# Patient Record
Sex: FEMALE | Race: WHITE
[De-identification: names, ages, dates, MRNs, and addresses within clinical notes are randomized per-mention and may not be internally consistent; named-entity substitution may affect disease eponyms.]

---

## 2017-08-16 ENCOUNTER — HOSPITAL ENCOUNTER (EMERGENCY)
Dept: HOSPITAL 83 - ED | Age: 22
Discharge: HOME | End: 2017-08-16
Payer: COMMERCIAL

## 2017-08-16 VITALS — WEIGHT: 140 LBS | HEIGHT: 62.99 IN | BODY MASS INDEX: 24.8 KG/M2

## 2017-08-16 DIAGNOSIS — M62.830: Primary | ICD-10-CM

## 2017-08-16 DIAGNOSIS — Z91.030: ICD-10-CM

## 2017-08-16 DIAGNOSIS — M54.6: ICD-10-CM

## 2017-08-16 DIAGNOSIS — K21.9: ICD-10-CM

## 2017-08-16 DIAGNOSIS — F17.200: ICD-10-CM

## 2017-08-16 DIAGNOSIS — G43.909: ICD-10-CM

## 2017-08-16 DIAGNOSIS — Z79.899: ICD-10-CM

## 2017-10-07 ENCOUNTER — HOSPITAL ENCOUNTER (EMERGENCY)
Dept: HOSPITAL 83 - ED | Age: 22
Discharge: HOME | End: 2017-10-07
Payer: COMMERCIAL

## 2017-10-07 VITALS — HEIGHT: 55 IN

## 2017-10-07 DIAGNOSIS — Z91.018: ICD-10-CM

## 2017-10-07 DIAGNOSIS — G43.909: ICD-10-CM

## 2017-10-07 DIAGNOSIS — K02.9: Primary | ICD-10-CM

## 2017-10-07 DIAGNOSIS — R03.0: ICD-10-CM

## 2017-10-07 DIAGNOSIS — Z91.030: ICD-10-CM

## 2017-10-07 DIAGNOSIS — F17.200: ICD-10-CM

## 2017-10-07 DIAGNOSIS — K21.9: ICD-10-CM

## 2018-02-02 ENCOUNTER — HOSPITAL ENCOUNTER (EMERGENCY)
Dept: HOSPITAL 83 - ED | Age: 23
Discharge: HOME | End: 2018-02-02
Payer: COMMERCIAL

## 2018-02-02 VITALS — HEIGHT: 62.99 IN | WEIGHT: 160 LBS | BODY MASS INDEX: 28.35 KG/M2

## 2018-02-02 DIAGNOSIS — F17.200: ICD-10-CM

## 2018-02-02 DIAGNOSIS — G43.909: ICD-10-CM

## 2018-02-02 DIAGNOSIS — Z91.030: ICD-10-CM

## 2018-02-02 DIAGNOSIS — K02.9: Primary | ICD-10-CM

## 2018-02-02 DIAGNOSIS — K21.9: ICD-10-CM

## 2018-02-02 DIAGNOSIS — Z91.018: ICD-10-CM

## 2018-02-02 DIAGNOSIS — R03.0: ICD-10-CM

## 2018-02-06 ENCOUNTER — HOSPITAL ENCOUNTER (EMERGENCY)
Dept: HOSPITAL 83 - ED | Age: 23
Discharge: HOME | End: 2018-02-06
Payer: COMMERCIAL

## 2018-02-06 VITALS — BODY MASS INDEX: 28.35 KG/M2 | HEIGHT: 62.99 IN | WEIGHT: 160 LBS

## 2018-02-06 DIAGNOSIS — Z91.018: ICD-10-CM

## 2018-02-06 DIAGNOSIS — F17.200: ICD-10-CM

## 2018-02-06 DIAGNOSIS — Z91.030: ICD-10-CM

## 2018-02-06 DIAGNOSIS — K02.9: Primary | ICD-10-CM

## 2018-02-06 DIAGNOSIS — Z79.899: ICD-10-CM

## 2018-06-20 ENCOUNTER — HOSPITAL ENCOUNTER (EMERGENCY)
Dept: HOSPITAL 83 - ED | Age: 23
Discharge: HOME | End: 2018-06-20
Payer: COMMERCIAL

## 2018-06-20 VITALS — BODY MASS INDEX: 28.35 KG/M2 | HEIGHT: 62.99 IN | WEIGHT: 160 LBS

## 2018-06-20 DIAGNOSIS — R30.0: Primary | ICD-10-CM

## 2018-06-20 DIAGNOSIS — R35.0: ICD-10-CM

## 2018-06-20 DIAGNOSIS — R39.15: ICD-10-CM

## 2018-06-20 DIAGNOSIS — Z91.030: ICD-10-CM

## 2018-06-20 LAB
APPEARANCE UR: (no result)
BACTERIA #/AREA URNS HPF: (no result) /[HPF]
BILIRUB UR QL STRIP: NEGATIVE
COLOR UR: YELLOW
EPI CELLS #/AREA URNS HPF: (no result) /[HPF]
GLUCOSE UR QL: NEGATIVE
HGB UR QL STRIP: (no result)
KETONES UR QL STRIP: NEGATIVE
LEUKOCYTE ESTERASE UR QL STRIP: (no result)
NITRITE UR QL STRIP: NEGATIVE
PH UR STRIP: 6.5 [PH] (ref 5–9)
RBC #/AREA URNS HPF: (no result) RBC/HPF (ref 0–2)
SP GR UR: 1.01 (ref 1–1.03)
UROBILINOGEN UR STRIP-MCNC: 0.2 E.U./DL (ref 0.2–1)
WBC #/AREA URNS HPF: (no result) WBC/HPF (ref 0–5)

## 2019-01-14 ENCOUNTER — HOSPITAL ENCOUNTER (EMERGENCY)
Dept: HOSPITAL 83 - ED | Age: 24
Discharge: HOME | End: 2019-01-14
Payer: COMMERCIAL

## 2019-01-14 VITALS — BODY MASS INDEX: 27.31 KG/M2 | HEIGHT: 63.98 IN | WEIGHT: 160 LBS

## 2019-01-14 DIAGNOSIS — Z32.01: Primary | ICD-10-CM

## 2019-01-14 DIAGNOSIS — G43.909: ICD-10-CM

## 2019-01-14 DIAGNOSIS — Z91.018: ICD-10-CM

## 2019-01-14 DIAGNOSIS — R03.0: ICD-10-CM

## 2019-01-14 DIAGNOSIS — Z91.030: ICD-10-CM

## 2019-01-14 DIAGNOSIS — K21.9: ICD-10-CM

## 2019-01-14 LAB
APPEARANCE UR: CLEAR
BACTERIA #/AREA URNS HPF: (no result) /[HPF]
BILIRUB UR QL STRIP: NEGATIVE
COLOR UR: YELLOW
EPI CELLS #/AREA URNS HPF: (no result) /[HPF]
GLUCOSE UR QL: NEGATIVE
HGB UR QL STRIP: (no result)
KETONES UR QL STRIP: NEGATIVE
LEUKOCYTE ESTERASE UR QL STRIP: NEGATIVE
NITRITE UR QL STRIP: NEGATIVE
PH UR STRIP: 6.5 [PH] (ref 5–9)
RBC #/AREA URNS HPF: (no result) RBC/HPF (ref 0–2)
SP GR UR: 1.01 (ref 1–1.03)
UROBILINOGEN UR STRIP-MCNC: 0.2 E.U./DL (ref 0.2–1)
WBC #/AREA URNS HPF: (no result) WBC/HPF (ref 0–5)

## 2019-01-30 ENCOUNTER — HOSPITAL ENCOUNTER (OUTPATIENT)
Dept: HOSPITAL 83 - US | Age: 24
Discharge: HOME | End: 2019-01-30
Attending: OBSTETRICS & GYNECOLOGY
Payer: COMMERCIAL

## 2019-01-30 DIAGNOSIS — Z3A.01: ICD-10-CM

## 2019-01-30 DIAGNOSIS — Z34.01: Primary | ICD-10-CM

## 2019-04-03 ENCOUNTER — HOSPITAL ENCOUNTER (EMERGENCY)
Dept: HOSPITAL 83 - ED | Age: 24
Discharge: HOME | End: 2019-04-03
Payer: COMMERCIAL

## 2019-04-03 VITALS — WEIGHT: 136 LBS | BODY MASS INDEX: 24.1 KG/M2 | HEIGHT: 62.99 IN

## 2019-04-03 DIAGNOSIS — O99.332: ICD-10-CM

## 2019-04-03 DIAGNOSIS — F17.200: ICD-10-CM

## 2019-04-03 DIAGNOSIS — Z91.030: ICD-10-CM

## 2019-04-03 DIAGNOSIS — Z91.018: ICD-10-CM

## 2019-04-03 DIAGNOSIS — O26.892: Primary | ICD-10-CM

## 2019-04-03 DIAGNOSIS — E86.0: ICD-10-CM

## 2019-04-03 DIAGNOSIS — Z79.899: ICD-10-CM

## 2019-04-03 DIAGNOSIS — Z3A.16: ICD-10-CM

## 2019-04-03 DIAGNOSIS — Z91.040: ICD-10-CM

## 2019-04-03 LAB
APPEARANCE UR: (no result)
BACTERIA #/AREA URNS HPF: (no result) /[HPF]
BASOPHILS # BLD AUTO: 0.1 10*3/UL (ref 0–0.1)
BASOPHILS NFR BLD AUTO: 0.4 % (ref 0–1)
BILIRUB UR QL STRIP: NEGATIVE
BUN SERPL-MCNC: 4 MG/DL (ref 7–24)
CHLORIDE SERPL-SCNC: 105 MMOL/L (ref 98–107)
COLOR UR: YELLOW
CREAT SERPL-MCNC: 0.51 MG/DL (ref 0.55–1.02)
EOSINOPHIL # BLD AUTO: 0.1 10*3/UL (ref 0–0.4)
EOSINOPHIL # BLD AUTO: 0.8 % (ref 1–4)
ERYTHROCYTE [DISTWIDTH] IN BLOOD BY AUTOMATED COUNT: 12.8 % (ref 0–14.5)
GLUCOSE UR QL: NEGATIVE
HCT VFR BLD AUTO: 38.5 % (ref 37–47)
HGB BLD-MCNC: 12.9 G/DL (ref 12–16)
HGB UR QL STRIP: NEGATIVE
KETONES UR QL STRIP: (no result)
LEUKOCYTE ESTERASE UR QL STRIP: NEGATIVE
LYMPHOCYTES # BLD AUTO: 2.2 10*3/UL (ref 1.3–4.4)
LYMPHOCYTES NFR BLD AUTO: 16.3 % (ref 27–41)
MCH RBC QN AUTO: 29.1 PG (ref 27–31)
MCHC RBC AUTO-ENTMCNC: 33.5 G/DL (ref 33–37)
MCV RBC AUTO: 86.7 FL (ref 81–99)
MONOCYTES # BLD AUTO: 0.9 10*3/UL (ref 0.1–1)
MONOCYTES NFR BLD MANUAL: 6.4 % (ref 3–9)
MUCOUS THREADS URNS QL MICRO: (no result)
NEUT #: 10.3 10*3/UL (ref 2.3–7.9)
NEUT %: 75.5 % (ref 47–73)
NITRITE UR QL STRIP: NEGATIVE
NRBC BLD QL AUTO: 0 % (ref 0–0)
PH UR STRIP: 6.5 [PH] (ref 5–9)
PLATELET # BLD AUTO: 194 10*3/UL (ref 130–400)
PMV BLD AUTO: 12.3 FL (ref 9.6–12.3)
POTASSIUM SERPL-SCNC: 3.3 MMOL/L (ref 3.5–5.1)
RBC # BLD AUTO: 4.44 10*6/UL (ref 4.1–5.1)
SODIUM SERPL-SCNC: 139 MMOL/L (ref 136–145)
SP GR UR: 1.01 (ref 1–1.03)
UROBILINOGEN UR STRIP-MCNC: 0.2 E.U./DL (ref 0.2–1)
WBC #/AREA URNS HPF: (no result) WBC/HPF (ref 0–5)
WBC NRBC COR # BLD AUTO: 13.7 10*3/UL (ref 4.8–10.8)

## 2019-05-06 ENCOUNTER — ROUTINE PRENATAL (OUTPATIENT)
Dept: OBGYN CLINIC | Age: 24
End: 2019-05-06
Payer: COMMERCIAL

## 2019-05-06 VITALS
HEIGHT: 63 IN | DIASTOLIC BLOOD PRESSURE: 66 MMHG | WEIGHT: 139 LBS | BODY MASS INDEX: 24.63 KG/M2 | HEART RATE: 65 BPM | SYSTOLIC BLOOD PRESSURE: 115 MMHG

## 2019-05-06 DIAGNOSIS — Z3A.20 20 WEEKS GESTATION OF PREGNANCY: ICD-10-CM

## 2019-05-06 DIAGNOSIS — O99.332 TOBACCO SMOKING AFFECTING PREGNANCY IN SECOND TRIMESTER: ICD-10-CM

## 2019-05-06 DIAGNOSIS — Z03.75 SUSPECTED SHORTENING OF CERVIX NOT FOUND: ICD-10-CM

## 2019-05-06 DIAGNOSIS — Z36.89 ENCOUNTER FOR FETAL ANATOMIC SURVEY: ICD-10-CM

## 2019-05-06 DIAGNOSIS — O09.212 PREVIOUS PRETERM DELIVERY IN SECOND TRIMESTER, ANTEPARTUM: ICD-10-CM

## 2019-05-06 DIAGNOSIS — F12.20 CANNABIS DEPENDENCE (HCC): ICD-10-CM

## 2019-05-06 DIAGNOSIS — O99.322 DRUG DEPENDENCE AFFECTING PREGNANCY IN SECOND TRIMESTER: Primary | ICD-10-CM

## 2019-05-06 PROCEDURE — G8427 DOCREV CUR MEDS BY ELIG CLIN: HCPCS | Performed by: OBSTETRICS & GYNECOLOGY

## 2019-05-06 PROCEDURE — 76811 OB US DETAILED SNGL FETUS: CPT | Performed by: OBSTETRICS & GYNECOLOGY

## 2019-05-06 PROCEDURE — 99201 HC NEW PT, E/M LEVEL 1: CPT | Performed by: OBSTETRICS & GYNECOLOGY

## 2019-05-06 PROCEDURE — 76817 TRANSVAGINAL US OBSTETRIC: CPT | Performed by: OBSTETRICS & GYNECOLOGY

## 2019-05-06 PROCEDURE — G8420 CALC BMI NORM PARAMETERS: HCPCS | Performed by: OBSTETRICS & GYNECOLOGY

## 2019-05-06 PROCEDURE — 99242 OFF/OP CONSLTJ NEW/EST SF 20: CPT | Performed by: OBSTETRICS & GYNECOLOGY

## 2019-05-06 NOTE — LETTER
19    RE:  Jessica Coelho   : 1995   AGE: 25 y.o. REFERRING PHYSICIAN:     Coretta Rodriguez MD    Dear Dr. Almaz aggarwal for referring Jessica Coelho a 25 y.o.  [id number] who is seen today in our office. REASON FOR CONSULTATION:  · Consultation and comanagement of a pregnant patient with history of substance abuse, cigarette smoking, and the previous  delivery. Mrs Jessica Coelho gave the following history when I saw her today:    OB History    Para Term  AB Living   3 1 0 1 1 1   SAB TAB Ectopic Molar Multiple Live Births   1 0 0 0 0 1      # Outcome Date GA Lbr Delvin/2nd Weight Sex Delivery Anes PTL Lv   3 Current            2 2014           1  12 36w4d  5 lb 6 oz (2.438 kg) F Vag-Spont  N BRENDON      Birth Comments: PTL     PAST GYNECOLOGICAL  HISTORY:  Negative for abnormal pap smears requiring surgical treatment. Negative for sexually transmitted diseases. PAST MEDICAL HISTORY:   Negative for Hypertension, Diabetes, Thyroid disease , Asthma or Heart disease. PAST SURGICAL HISTORY:  Negative for Appendectomy, Cholecystectomy or surgery on the cervix such as LEEP, [facility] or Cryotherapy. ALLERGIES:    No Known Allergies    MEDICATIONS:    Prenatal Vitamins once per day   17p 250 mg IM once per week. SOCIAL  HISTORY:  She smokes one pack of cigarettes per day. Last used marijuana was three weeks ago. She denied alcohol and other illicit drug abuse      REVIEW OF SYSTEMS:    CONSTITUTIONAL : No fever, no chills   HEENT :  No headache, no visual changes, no rhinorrhea, no sore throat   CARDIOVASCULAR :  No pain, no palpitations, no edema   RESPIRATORY :  No pain, no shortness of breath   GASTROINTESTINAL : No N/V, no D/C, no abdominal pain   GENITOURINARY :  No dysuria, hematuria and no incontinence   MUSCULOSKELETAL:  No myalgia, No back pain  NEUROLOGICAL :  No numbness, no tingling, no tremors.  No history of seizures I reviewed with the patient the result of the:  · Quad screen test collected  On 2019 that showed low probability of having baby with open spina bifida and trisomy 24 or 25. · Positive drug screen for marijuana on 2019    IMPRESSION:  1. A 20w4d intrauterine pregnancy. 2. Previous  delivery. 3. Marijuana abuse complicating pregnancy  4. Cigarette smoking during pregnancy. RECOMMENDATIONS/PLAN:  I discussed with the patient the following points:    1. The benefits and limitations of ultrasound in prenatal diagnosis. Some defects might not always be seen by ultrasound. Estimated incidence of these defects in the general population is 2- 4%. 2. No structural  anomalies are noted. Only genetic amniocentesis can rule out fetal chromosome anomalies. Normal ultrasound does not. 3. The size of her baby is appropriate for gestational age. 4. Based on her age and the absence of chromosomal markers by ultrasound today, the risk of chromosomal anomalies is small and does not indicate a need for an amniocentesis, a procedure that might cause pregnancy loss. ( the risk of loss is quoted to be between 1:200 to 1:500). An amniocentesis is indicated if fetal structural defects are seen or if the first Trimester,  second trimester hormonal screening test (quad screen) , or if the cell free DNA test NIPT: non-invasive prenatal test) showed an increase risk for Chromosomal anomalies. 5. Her second Trimester Hormonal Screening test showed a small risk that does not justify a genetic amniocentesis. 6. The ill effects of cigarette smoking and substance abuse during pregnancy and its association with an increased risk of intrauterine growth restriction, placental abruption, and pregnancy loss.  In addition to the increased risk of  morbidity and mortality there is an increased risk of sudden infant death syndrome in the households where people smoke. I recommend that she stops smoking, and abstains from illicit drug use. 7. She should continue the treatment with weekly IM injections of 17 AlphahydroxyProgesterone Caproate (17p) 250 mg, to decrease the likelihood of another  delivery. 8. The cervical length correlates with the risk of  delivery. The cervical length today is reassuring. No funneling or shortening were noted. 9. She is to continue to follow with you in your office for ongoing obstetric care. 10. I recommend a follow up ultrasound evaluation in 8 weeks, to check on cervical length and on fetal well being anatomy and growth. Thank you again, doctor, for allowing us to be of service to your patient. If I can be of further assistance, please do not hesitate to call. Sincerely,        Jacky Welsh M.D., [address] [facility] [address]    Current encounter billing:  IA OFFICE CONSULTATION NEW/ESTAB PATIENT 30 MIN [21346]  US OB Detail Fetal Anatomy Single or 1st [[id number] Custom]  US OB Transvaginal [id number] Custom]    **This report has been created using voice recognition software.  It may contain minor errors     which are inherent in voice recognition technology**

## 2019-05-06 NOTE — PATIENT INSTRUCTIONS
Call your primary obstetrician with bleeding, leaking of fluid, abdominal tenderness, headache, blurry vision, epigastric pain and increased urinary frequency. Any questions contact Kristine Montejo at [phone]. if you are sick, not feeling well or have an infectious process going on please reschedule your appointment by calling [phone]. Also if any family members are not feeling well, please do not bring them to your appointment. We appreciate your cooperation. We are doing this in order to protect our pregnant mothers+ their babies. Patient Education        Weeks 18 to 22 of Your Pregnancy: Care Instructions  Your Care Instructions    Your baby is continuing to develop quickly. At this stage, babies can now suck their thumbs,  firmly with their hands, and open and close their eyelids. Sometime between 18 and 22 weeks, you will start to feel your baby move. At first, these small fetal movements feel like fluttering or \"butterflies. \" Some women say that they feel like gas bubbles. As the baby grows, these movements will become stronger. You may also notice that your baby kicks and hiccups. During this time, you may find that your nausea and fatigue are gone. Overall, you may feel better and have more energy than you did in your first trimester. But you may also have new discomforts now, such as sleep problems or leg cramps. This care sheet can help you ease these discomforts. Follow-up care is a key part of your treatment and safety. Be sure to make and go to all appointments, and call your doctor if you are having problems. It's also a good idea to know your test results and keep a list of the medicines you take. How can you care for yourself at home? Ease sleep problems  · Avoid caffeine in drinks or chocolate late in the day. · Get some exercise every day. · Take a warm shower or bath before bed. · Have a light snack or glass of milk at bedtime.   · Do relaxation exercises in bed to calm your mind and body.  · Support your legs and back with extra pillows. Try a pillow between your legs if you sleep on your side. · Do not use sleeping pills or alcohol. They could harm your baby. Ease leg cramps  · Do not massage your calf during the cramp. · Sit on a firm bed or chair. Straighten your leg, and bend your foot (flex your ankle) slowly upward, toward your knee. Bend your toes up and down. · Stand on a cool, flat surface. Stretch your toes upward, and take small steps walking on your heels. · Use a heating pad or hot water bottle to help with muscle ache. Prevent leg cramps  · Be sure to get enough calcium. If you are worried that you are not getting enough, talk to your doctor. · Exercise every day, and stretch your legs before bed. · Take a warm bath before bed, and try leg warmers at night. Where can you learn more? Go to https://SalesPortal.ZeePearl. org and sign in to your ThinAir Wireless account. Enter Y235 in the Mission Research box to learn more about \"Weeks 18 to 22 of Your Pregnancy: Care Instructions. \"     If you do not have an account, please click on the \"Sign Up Now\" link. Current as of: September 5, 2018  Content Version: 11.9  © 1004-4722 Widemile, Incorporated. Care instructions adapted under license by [facility] ([facility]). If you have questions about a medical condition or this instruction, always ask your healthcare professional. [place] [address] any warranty or liability for your use of this information. Patient Education        Learning About When to Call Your Doctor During Pregnancy (After 20 Weeks)  Your Care Instructions  It's common to have concerns about what might be a problem during pregnancy. Although most pregnant women don't have any serious problems, it's important to know when to call your doctor if you have certain symptoms or signs of labor. These are general suggestions.  Your doctor may give you some more information about when to call.  When to call your doctor (after 20 weeks)  Call 911 anytime you think you may need emergency care. For example, call if:  · You have severe vaginal bleeding. · You have sudden, severe pain in your belly. · You passed out (lost consciousness). · You have a seizure. · You see or feel the umbilical cord. · You think you are about to deliver your baby and can't make it safely to the hospital.  Call your doctor now or seek immediate medical care if:  · You have vaginal bleeding. · You have belly pain. · You have a fever. · You have symptoms of preeclampsia, such as:  ? Sudden swelling of your face, hands, or feet. ? New vision problems (such as dimness or blurring). ? A severe headache. · You have a sudden release of fluid from your vagina. (You think your water broke.)  · You think that you may be in labor. This means that you've had at least 4 contractions within 20 minutes or at least 8 contractions in an hour. · You notice that your baby has stopped moving or is moving much less than normal.  · You have symptoms of a urinary tract infection. These may include:  ? Pain or burning when you urinate. ? A frequent need to urinate without being able to pass much urine. ? Pain in the flank, which is just below the rib cage and above the waist on either side of the back. ? Blood in your urine. Watch closely for changes in your health, and be sure to contact your doctor if:  · You have vaginal discharge that smells bad. · You have skin changes, such as:  ? A rash. ? Itching. ? Yellow color to your skin. · You have other concerns about your pregnancy. If you have labor signs at 37 weeks or more  If you have signs of labor at 37 weeks or more, your doctor may tell you to call when your labor becomes more active. Symptoms of active labor include:  · Contractions that are regular. · Contractions that are less than 5 minutes apart. · Contractions that are hard to talk through.   Follow-up care is a key part of your treatment and safety. Be sure to make and go to all appointments, and call your doctor if you are having problems. It's also a good idea to know your test results and keep a list of the medicines you take. Where can you learn more? Go to https://chpenancyeweb.Fortressware. org and sign in to your Ziparihart account. Enter  in the Sanovi Technologies box to learn more about \"Learning About When to Call Your Doctor During Pregnancy (After 20 Weeks). \"     If you do not have an account, please click on the \"Sign Up Now\" link. Current as of: September 5, 2018  Content Version: 11.9  © 0995-7032 LUX Assure, Incorporated. Care instructions adapted under license by [facility] ([facility]). If you have questions about a medical condition or this instruction, always ask your healthcare professional. [address] any warranty or liability for your use of this information.

## 2019-05-06 NOTE — PROGRESS NOTES
19    RE:  Alvin Woo   : 1995   AGE: 25 y.o. REFERRING PHYSICIAN:     Prosper Van MD    Dear Dr. Katy Lanes you for referring Alvin Woo a 25 y.o.  [id number] who is seen today in our office. REASON FOR CONSULTATION:  · Consultation and comanagement of a pregnant patient with history of substance abuse, cigarette smoking, and the previous  delivery. Mrs Alvin Woo gave the following history when I saw her today:    OB History    Para Term  AB Living   3 1 0 1 1 1   SAB TAB Ectopic Molar Multiple Live Births   1 0 0 0 0 1      # Outcome Date GA Lbr Delvin/2nd Weight Sex Delivery Anes PTL Lv   3 Current            2 2014           1  12 36w4d  5 lb 6 oz (2.438 kg) F Vag-Spont  N BRENDON      Birth Comments: PTL     PAST GYNECOLOGICAL  HISTORY:  Negative for abnormal pap smears requiring surgical treatment. Negative for sexually transmitted diseases. PAST MEDICAL HISTORY:   Negative for Hypertension, Diabetes, Thyroid disease , Asthma or Heart disease. PAST SURGICAL HISTORY:  Negative for Appendectomy, Cholecystectomy or surgery on the cervix such as LEEP, [facility] or Cryotherapy. ALLERGIES:    No Known Allergies    MEDICATIONS:    Prenatal Vitamins once per day   17p 250 mg IM once per week. SOCIAL  HISTORY:  She smokes one pack of cigarettes per day. Last used marijuana was three weeks ago. She denied alcohol and other illicit drug abuse      REVIEW OF SYSTEMS:    CONSTITUTIONAL : No fever, no chills   HEENT :  No headache, no visual changes, no rhinorrhea, no sore throat   CARDIOVASCULAR :  No pain, no palpitations, no edema   RESPIRATORY :  No pain, no shortness of breath   GASTROINTESTINAL : No N/V, no D/C, no abdominal pain   GENITOURINARY :  No dysuria, hematuria and no incontinence   MUSCULOSKELETAL:  No myalgia, No back pain  NEUROLOGICAL :  No numbness, no tingling, no tremors.  No history of seizures    FAMILY MEDICAL of the:  · Quad screen test collected  On 2019 that showed low probability of having baby with open spina bifida and trisomy 24 or 18. · Positive drug screen for marijuana on 2019    IMPRESSION:  1. A 20w4d intrauterine pregnancy. 2. Previous  delivery. 3. Marijuana abuse complicating pregnancy  4. Cigarette smoking during pregnancy. RECOMMENDATIONS/PLAN:  I discussed with the patient the following points:    1. The benefits and limitations of ultrasound in prenatal diagnosis. Some defects might not always be seen by ultrasound. Estimated incidence of these defects in the general population is 2- 4%. 2. No structural  anomalies are noted. Only genetic amniocentesis can rule out fetal chromosome anomalies. Normal ultrasound does not. 3. The size of her baby is appropriate for gestational age. 4. Based on her age and the absence of chromosomal markers by ultrasound today, the risk of chromosomal anomalies is small and does not indicate a need for an amniocentesis, a procedure that might cause pregnancy loss. ( the risk of loss is quoted to be between 1:200 to 1:500). An amniocentesis is indicated if fetal structural defects are seen or if the first Trimester,  second trimester hormonal screening test (quad screen) , or if the cell free DNA test NIPT: non-invasive prenatal test) showed an increase risk for Chromosomal anomalies. 5. Her second Trimester Hormonal Screening test showed a small risk that does not justify a genetic amniocentesis. 6. The ill effects of cigarette smoking and substance abuse during pregnancy and its association with an increased risk of intrauterine growth restriction, placental abruption, and pregnancy loss. In addition to the increased risk of  morbidity and mortality there is an increased risk of sudden infant death syndrome in the households where people smoke. I recommend that she stops smoking, and abstains from illicit drug use.   7. She should continue the treatment with weekly IM injections of 17 AlphahydroxyProgesterone Caproate (17p) 250 mg, to decrease the likelihood of another  delivery. 8. The cervical length correlates with the risk of  delivery. The cervical length today is reassuring. No funneling or shortening were noted. 9. She is to continue to follow with you in your office for ongoing obstetric care. 10. I recommend a follow up ultrasound evaluation in 8 weeks, to check on cervical length and on fetal well being anatomy and growth. Thank you again, doctor, for allowing us to be of service to your patient. If I can be of further assistance, please do not hesitate to call. Sincerely,        Zhang Johns M.D., [address] [facility]    Current encounter billing:  IN OFFICE CONSULTATION NEW/ESTAB PATIENT 30 MIN [[id number]]  US OB Detail Fetal Anatomy Single or 1st [[id number] Custom]  US OB Transvaginal [id number] Custom]    **This report has been created using voice recognition software.  It may contain minor errors     which are inherent in voice recognition technology**

## 2019-07-01 ENCOUNTER — ROUTINE PRENATAL (OUTPATIENT)
Dept: OBGYN CLINIC | Age: 24
End: 2019-07-01
Payer: COMMERCIAL

## 2019-07-01 VITALS
WEIGHT: 150 LBS | HEIGHT: 63 IN | BODY MASS INDEX: 26.58 KG/M2 | HEART RATE: 81 BPM | SYSTOLIC BLOOD PRESSURE: 120 MMHG | DIASTOLIC BLOOD PRESSURE: 74 MMHG

## 2019-07-01 DIAGNOSIS — O99.322 DRUG DEPENDENCE AFFECTING PREGNANCY IN SECOND TRIMESTER: ICD-10-CM

## 2019-07-01 DIAGNOSIS — Z03.75 SUSPECTED SHORTENING OF CERVIX NOT FOUND: ICD-10-CM

## 2019-07-01 DIAGNOSIS — Z3A.28 28 WEEKS GESTATION OF PREGNANCY: ICD-10-CM

## 2019-07-01 DIAGNOSIS — O99.333 TOBACCO SMOKING COMPLICATING PREGNANCY, THIRD TRIMESTER: ICD-10-CM

## 2019-07-01 DIAGNOSIS — O09.213 PREVIOUS PRETERM DELIVERY IN THIRD TRIMESTER, ANTEPARTUM: Primary | ICD-10-CM

## 2019-07-01 DIAGNOSIS — F12.20 CANNABIS DEPENDENCE (HCC): ICD-10-CM

## 2019-07-01 DIAGNOSIS — Z36.89 ENCOUNTER FOR FETAL ANATOMIC SURVEY: ICD-10-CM

## 2019-07-01 PROCEDURE — 76817 TRANSVAGINAL US OBSTETRIC: CPT | Performed by: OBSTETRICS & GYNECOLOGY

## 2019-07-01 PROCEDURE — G8419 CALC BMI OUT NRM PARAM NOF/U: HCPCS | Performed by: OBSTETRICS & GYNECOLOGY

## 2019-07-01 PROCEDURE — 76805 OB US >/= 14 WKS SNGL FETUS: CPT | Performed by: OBSTETRICS & GYNECOLOGY

## 2019-07-01 PROCEDURE — 99213 OFFICE O/P EST LOW 20 MIN: CPT | Performed by: OBSTETRICS & GYNECOLOGY

## 2019-07-01 PROCEDURE — G8427 DOCREV CUR MEDS BY ELIG CLIN: HCPCS | Performed by: OBSTETRICS & GYNECOLOGY

## 2019-07-01 PROCEDURE — 4004F PT TOBACCO SCREEN RCVD TLK: CPT | Performed by: OBSTETRICS & GYNECOLOGY

## 2019-07-01 PROCEDURE — 99211 OFF/OP EST MAY X REQ PHY/QHP: CPT | Performed by: OBSTETRICS & GYNECOLOGY

## 2019-07-01 PROCEDURE — 76819 FETAL BIOPHYS PROFIL W/O NST: CPT | Performed by: OBSTETRICS & GYNECOLOGY

## 2019-07-01 NOTE — PATIENT INSTRUCTIONS
below it. Move your fingers down toward your belly button to find the top of your uterus. Check to see if it is tight. ? Contractions can be weak or strong. Record your contractions for an hour. Time a contraction from the start of one contraction to the start of the next one.  ? Single or several strong contractions without a pattern are called Hopewell-Haile contractions. They are practice contractions but not the start of labor. They often stop if you change what you are doing. ? Call your doctor if you have regular contractions. Where can you learn more? Go to https://[email or web address] org and sign in to your Accuris Networks account. Enter K604 in the Starline box to learn more about \"Weeks 26 to 30 of Your Pregnancy: Care Instructions. \"     If you do not have an account, please click on the \"Sign Up Now\" link. Current as of: September 5, 2018  Content Version: 12.0  © 7977-3012 [employer]. Care instructions adapted under license by [facility] ([facility]). If you have questions about a medical condition or this instruction, always ask your healthcare professional. [place] [address] any warranty or liability for your use of this information. Patient Education        Learning About When to Call Your Doctor During Pregnancy (After 20 Weeks)  Your Care Instructions  It's common to have concerns about what might be a problem during pregnancy. Although most pregnant women don't have any serious problems, it's important to know when to call your doctor if you have certain symptoms or signs of labor. These are general suggestions. Your doctor may give you some more information about when to call. When to call your doctor (after 20 weeks)  Call 911 anytime you think you may need emergency care. For example, call if:  · You have severe vaginal bleeding. · You have sudden, severe pain in your belly. · You passed out (lost consciousness). · You have a seizure.   · You

## 2019-07-30 ENCOUNTER — ROUTINE PRENATAL (OUTPATIENT)
Dept: OBGYN CLINIC | Age: 24
End: 2019-07-30
Payer: COMMERCIAL

## 2019-07-30 VITALS
WEIGHT: 155 LBS | BODY MASS INDEX: 27.46 KG/M2 | SYSTOLIC BLOOD PRESSURE: 118 MMHG | HEART RATE: 96 BPM | DIASTOLIC BLOOD PRESSURE: 68 MMHG

## 2019-07-30 DIAGNOSIS — O99.323 DRUG DEPENDENCE AFFECTING PREGNANCY IN THIRD TRIMESTER: ICD-10-CM

## 2019-07-30 DIAGNOSIS — Z3A.32 32 WEEKS GESTATION OF PREGNANCY: ICD-10-CM

## 2019-07-30 DIAGNOSIS — O99.333 TOBACCO SMOKING COMPLICATING PREGNANCY, THIRD TRIMESTER: ICD-10-CM

## 2019-07-30 DIAGNOSIS — F12.20 CANNABIS DEPENDENCE (HCC): ICD-10-CM

## 2019-07-30 DIAGNOSIS — Z03.75 SUSPECTED SHORTENING OF CERVIX NOT FOUND: ICD-10-CM

## 2019-07-30 DIAGNOSIS — O09.213 PREVIOUS PRETERM DELIVERY IN THIRD TRIMESTER, ANTEPARTUM: Primary | ICD-10-CM

## 2019-07-30 LAB
GLUCOSE URINE, POC: NORMAL
PROTEIN UA: POSITIVE

## 2019-07-30 PROCEDURE — 4004F PT TOBACCO SCREEN RCVD TLK: CPT | Performed by: OBSTETRICS & GYNECOLOGY

## 2019-07-30 PROCEDURE — 99211 OFF/OP EST MAY X REQ PHY/QHP: CPT | Performed by: OBSTETRICS & GYNECOLOGY

## 2019-07-30 PROCEDURE — G8427 DOCREV CUR MEDS BY ELIG CLIN: HCPCS | Performed by: OBSTETRICS & GYNECOLOGY

## 2019-07-30 PROCEDURE — 81002 URINALYSIS NONAUTO W/O SCOPE: CPT | Performed by: OBSTETRICS & GYNECOLOGY

## 2019-07-30 PROCEDURE — 76816 OB US FOLLOW-UP PER FETUS: CPT | Performed by: OBSTETRICS & GYNECOLOGY

## 2019-07-30 PROCEDURE — 76819 FETAL BIOPHYS PROFIL W/O NST: CPT | Performed by: OBSTETRICS & GYNECOLOGY

## 2019-07-30 PROCEDURE — 99213 OFFICE O/P EST LOW 20 MIN: CPT | Performed by: OBSTETRICS & GYNECOLOGY

## 2019-07-30 PROCEDURE — 76817 TRANSVAGINAL US OBSTETRIC: CPT | Performed by: OBSTETRICS & GYNECOLOGY

## 2019-07-30 PROCEDURE — G8419 CALC BMI OUT NRM PARAM NOF/U: HCPCS | Performed by: OBSTETRICS & GYNECOLOGY

## 2019-07-30 RX ORDER — AMOXICILLIN 500 MG/1
500 CAPSULE ORAL 4 TIMES DAILY
COMMUNITY
End: 2019-08-20

## 2019-07-30 NOTE — PATIENT INSTRUCTIONS
test positive, you will get antibiotics during labor. These will keep your baby from getting the bacteria. You may want to talk with your doctor about banking your baby's umbilical cord blood. This is the blood left in the cord after birth. If you want to save this blood, you must arrange it ahead of time. You can't decide at the last minute. If you haven't already had the Tdap shot during this pregnancy, talk to your doctor about getting it. It will help protect your  against pertussis infection. Follow-up care is a key part of your treatment and safety. Be sure to make and go to all appointments, and call your doctor if you are having problems. It's also a good idea to know your test results and keep a list of the medicines you take. How can you care for yourself at home? Ease hemorrhoids  · Get more liquids, fruits, vegetables, and fiber in your diet. This will help keep your stools soft. · Avoid sitting for too long. Lie on your left side several times a day. · Clean yourself with soft, moist toilet paper. Or you can use witch hazel pads or personal hygiene pads. · If you are uncomfortable, try ice packs. Or you can sit in a warm sitz bath. Do these for 20 minutes at a time, as needed. · Use hydrocortisone cream for pain and itching. Two examples are Anusol and Preparation H Hydrocortisone. · Ask your doctor about taking an over-the-counter stool softener. Consider breastfeeding  · Experts recommend that women breastfeed for 1 year or longer. Breast milk is the perfect food for babies. · Breast milk is easier for babies to digest than formula. And it is always available, just the right temperature, and free. · Breast milk may help protect your child from some health problems.  babies are less likely than formula-fed babies to:  ? Get ear infections, colds, diarrhea, and pneumonia. ? Be obese or get diabetes later in life. · Women who breastfeed have less bleeding after the birth. Their uteruses also shrink back faster. · Some women who breastfeed lose weight faster. Making milk burns calories. · Breastfeeding can lower your risk of breast cancer, ovarian cancer, and osteoporosis. Decide about circumcision for boys  · As you make this decision, it may help to think about your personal, Cheondoism, and family traditions. You get to decide if you will keep your son's penis natural or if he will be circumcised. · If you decide that you would like to have your baby circumcised, talk with your doctor. You can share your concerns about pain. And you can discuss your preferences for anesthesia. Where can you learn more? Go to https://AddFleetpepiceweb.healthXiaoSheng.fm. org and sign in to your Offerboard account. Enter C507 in the JoopLoop box to learn more about \"Weeks 32 to 34 of Your Pregnancy: Care Instructions. \"     If you do not have an account, please click on the \"Sign Up Now\" link. Current as of: September 5, 2018  Content Version: 12.0  © 3396-4832 Healthwise, Incorporated. Care instructions adapted under license by [facility] ([facility]). If you have questions about a medical condition or this instruction, always ask your healthcare professional. [place] [address] any warranty or liability for your use of this information. Patient Education        Learning About When to Call Your Doctor During Pregnancy (After 20 Weeks)  Your Care Instructions  It's common to have concerns about what might be a problem during pregnancy. Although most pregnant women don't have any serious problems, it's important to know when to call your doctor if you have certain symptoms or signs of labor. These are general suggestions. Your doctor may give you some more information about when to call. When to call your doctor (after 20 weeks)  Call 911 anytime you think you may need emergency care. For example, call if:  · You have severe vaginal bleeding.   · You have sudden, severe pain in start again the next day. · Pick your baby's most active time of day to count. This may be any time from morning to evening. · If you do not feel 10 movements in an hour, your baby may be sleeping. Wait for the next hour and count again. When should you call for help? Call your doctor now or seek immediate medical care if:    · You noticed that your baby has stopped moving or is moving much less than normal.    Watch closely for changes in your health, and be sure to contact your doctor if you have any problems. Where can you learn more? Go to https://WeSwap.compeJ&V Big Game Outfitters.Engiver. org and sign in to your Infor account. Enter A244 in the bluebird bio box to learn more about \"Counting Your Baby's Kicks: Care Instructions. \"     If you do not have an account, please click on the \"Sign Up Now\" link. Current as of: September 5, 2018  Content Version: 12.0  © 3416-9496 Healthwise, Incorporated. Care instructions adapted under license by [facility] ([facility]). If you have questions about a medical condition or this instruction, always ask your healthcare professional. [place] [address] any warranty or liability for your use of this information.

## 2019-07-30 NOTE — PROGRESS NOTES
19     RE:  Jesusita Delgado   : 1995   AGE: 25 y.o. REFERRING PHYSICIAN: Susi Lara MD    Dear   Mrs. Jesusita Delgado a 25 y.o.  [id number]  is seen today on follow up in our office. REASON FOR APPOINTMENT:  · Follow-up on  a pregnant patient with history of substance abuse, cigarette smoking, and the previous  delivery. MEDICATIONS:    Prenatal Vitamins once per day   17p 250 mg IM once per week. INTERVAL HISTORY:  Mrs Jesusita Delgado had an uneventful course of pregnancy since her last visit to our office. She is still smoking 10 cigarettes/day. She said she stopped using marijuana when found pregnant. When seen today in our office she had no complaints. PHYSICAL EXAMINATION:  General Appearance:  Healthy looking, alert, no acute distress. Eyes:     No pallor, no icterus, no photophobia. Ears:     No ear drainage. Nose:     No nasal drainage, no paranasal sinus tenderness. Throat:   Mucosa moist, no oral thrush, no exudate. Neck:     No nuchal rigidity. Back:     No CVA tenderness. Abdomen:    Soft nontender. Extremities:    No pretibial pitting edema, no calf muscle tenderness. Skin:     No rashes, no lesions. BP: 118/68 Weight: 155 lb (70.3 kg)   Pulse: 96     Body mass index is 27.46 kg/m². Urine dipstick:  Glucose : Negative   Albumin:  Trace       An ultrasound evaluation was done in our office today. Please refer to the enclosed copy of the ultrasound report for further information. IMPRESSION:  1. A  32w5d  intrauterine gestation. 2. Previous  delivery. 3. Marijuana abuse complicating pregnancy  4. Cigarette smoking during pregnancy. RECOMMENDATIONS/PLAN:  I discussed with the patient the following points:    1. The benefits and limitations of ultrasound in prenatal diagnosis. Some defects might not always be seen by ultrasound. Estimated incidence of these defects in the general population is 2- 4%.    2. No structural

## 2019-07-30 NOTE — LETTER
[facility] OFFICE OUTPATIENT VISIT 15 MINUTES [70485]  US OB Follow Up Transabdominal Approach [PDT042 Custom]  US Fetal Biophysical Profile WO Non Stress Testing [22105 Custom]  US OB Transvaginal [19439 Custom]    **This report has been created using voice recognition software.  It may contain minor errors     which are inherent in voice recognition technology**

## 2019-08-08 ENCOUNTER — HOSPITAL ENCOUNTER (OUTPATIENT)
Age: 24
Discharge: HOME | End: 2019-08-08
Attending: OBSTETRICS & GYNECOLOGY | Admitting: OBSTETRICS & GYNECOLOGY
Payer: COMMERCIAL

## 2019-08-08 VITALS
HEART RATE: 93 BPM | TEMPERATURE: 97.8 F | SYSTOLIC BLOOD PRESSURE: 128 MMHG | RESPIRATION RATE: 16 BRPM | DIASTOLIC BLOOD PRESSURE: 70 MMHG

## 2019-08-08 PROBLEM — R10.2 PELVIC PRESSURE IN PREGNANCY: Status: ACTIVE | Noted: 2019-08-08

## 2019-08-08 PROBLEM — O26.899 PELVIC PRESSURE IN PREGNANCY: Status: ACTIVE | Noted: 2019-08-08

## 2019-08-08 LAB
BACTERIA: ABNORMAL /HPF
BILIRUBIN URINE: NEGATIVE
BLOOD, URINE: NEGATIVE
CLARITY: ABNORMAL
COLOR: YELLOW
EPITHELIAL CELLS, UA: ABNORMAL /HPF
GLUCOSE URINE: NEGATIVE MG/DL
KETONES, URINE: NEGATIVE MG/DL
LEUKOCYTE ESTERASE, URINE: ABNORMAL
MUCUS: PRESENT
NITRITE, URINE: NEGATIVE
PH UA: 6 (ref 5–9)
PROTEIN UA: NEGATIVE MG/DL
RBC UA: ABNORMAL /HPF (ref 0–2)
SPECIFIC GRAVITY UA: 1.02 (ref 1–1.03)
UROBILINOGEN, URINE: 0.2 E.U./DL
WBC UA: ABNORMAL /HPF (ref 0–5)

## 2019-08-08 PROCEDURE — 99202 OFFICE O/P NEW SF 15 MIN: CPT

## 2019-08-08 PROCEDURE — 81001 URINALYSIS AUTO W/SCOPE: CPT

## 2019-08-08 PROCEDURE — 99213 OFFICE O/P EST LOW 20 MIN: CPT | Performed by: MIDWIFE

## 2019-08-08 PROCEDURE — 6360000002 HC RX W HCPCS: Performed by: OBSTETRICS & GYNECOLOGY

## 2019-08-08 PROCEDURE — 2580000003 HC RX 258: Performed by: MIDWIFE

## 2019-08-08 RX ORDER — CEFAZOLIN SODIUM 2 G/50ML
2 SOLUTION INTRAVENOUS ONCE
Status: COMPLETED | OUTPATIENT
Start: 2019-08-08 | End: 2019-08-08

## 2019-08-08 RX ORDER — TERBUTALINE SULFATE 1 MG/ML
0.25 INJECTION, SOLUTION SUBCUTANEOUS ONCE
Status: COMPLETED | OUTPATIENT
Start: 2019-08-08 | End: 2019-08-08

## 2019-08-08 RX ORDER — SODIUM CHLORIDE, SODIUM LACTATE, POTASSIUM CHLORIDE, CALCIUM CHLORIDE 600; 310; 30; 20 MG/100ML; MG/100ML; MG/100ML; MG/100ML
INJECTION, SOLUTION INTRAVENOUS CONTINUOUS
Status: DISCONTINUED | OUTPATIENT
Start: 2019-08-08 | End: 2019-08-08 | Stop reason: HOSPADM

## 2019-08-08 RX ADMIN — CEFAZOLIN SODIUM 2 G: 2 SOLUTION INTRAVENOUS at 17:43

## 2019-08-08 RX ADMIN — SODIUM CHLORIDE, POTASSIUM CHLORIDE, SODIUM LACTATE AND CALCIUM CHLORIDE: 600; 310; 30; 20 INJECTION, SOLUTION INTRAVENOUS at 18:30

## 2019-08-08 RX ADMIN — SODIUM CHLORIDE, POTASSIUM CHLORIDE, SODIUM LACTATE AND CALCIUM CHLORIDE: 600; 310; 30; 20 INJECTION, SOLUTION INTRAVENOUS at 17:30

## 2019-08-08 RX ADMIN — TERBUTALINE SULFATE 0.25 MG: 1 INJECTION SUBCUTANEOUS at 17:43

## 2019-08-08 NOTE — H&P
file.    Medications Prior to Admission:  Medications Prior to Admission: amoxicillin (AMOXIL) 500 MG capsule, Take 500 mg by mouth 4 times daily Indications: Done with it 1 week ago  PROGESTERONE IM, Inject 250 mg into the muscle  Prenatal Multivit-Min-Fe-FA (PRENATAL PO), Take  by mouth. Allergies:  Patient has no known allergies. Review of Systems:     REVIEW OF SYSTEMS:          CONSTITUTIONAL :      No fever, no chills   HEENT :                         No headache, no visual changes  CARDIOVASCULAR :    No pain, no palpitations, no edema   RESPIRATORY :            No pain, no shortness of breath   GASTROINTESTINAL : No N/V, no D/C, no abdominal pain  GENITOURINARY :       No dysuria, hematuria and no incontinence   MUSCULOSKELETAL:  No myalgia, no back pain   NEUROLOGICAL :        No history of seizures. PHYSICAL EXAM:    /70   Pulse 93   Temp 97.8 °F (36.6 °C) (Oral)   Resp 16   LMP 11/15/2018     General appearance:  awake, alert, cooperative, no apparent distress, and appears stated age  Neurologic:  Awake, alert, oriented to name, place and time.   Ambulatory to unit      Lungs:  Respirations easy      Abdomen:   soft, gravid, non-tender, no CVA tenderness   Fetal position vertex   EFW AGA  Fetal heart rate:  Baseline Heart Rate 130   Variability mod   + accelerations   no decelerations  Pelvis:  External Genitalia: no lesions  SSE:  NA  Cervix:  DILATION:  FT cm  EFFACEMENT:   50%  STATION:  -2  POSITION:  post  CONSISTENCY:  med    Contractions:  Irregular, q 1-3 minutes  Membranes:  intact       GBS  unknown       Impression:  25year old  at 34.0 weeks gestation, pelvic pressure,  contractions, history of  birth, GBS unknown, category I tracing    Discussed with Dr. Heath [place]:  IV hydration, UA        Electronically signed by SHIVA Roberson CNM on 2019 at 2:59 PM

## 2019-08-08 NOTE — PROGRESS NOTES
Patient is 34 weeks gestation that presented to L+d with complaints ofcontractions that began x3 days ago. Patient states that they progressively have been worse. Patient admits to pelvic pain and back pain that has also progressively become worse. She denies leaking of fluid,  Admits to +fm, and states she had pink tinged spotting that happened around 11am today only when patient wiped, patient states spot was dime sized. Patient denied intercourse with partner. efm applied, vitals obtained.

## 2019-08-20 PROBLEM — O09.893 HISTORY OF PRETERM DELIVERY, CURRENTLY PREGNANT IN THIRD TRIMESTER: Status: ACTIVE | Noted: 2019-08-20

## 2019-08-25 ENCOUNTER — HOSPITAL ENCOUNTER (OUTPATIENT)
Age: 24
Setting detail: OBSERVATION
Discharge: HOME | End: 2019-08-26
Attending: OBSTETRICS & GYNECOLOGY | Admitting: OBSTETRICS & GYNECOLOGY
Payer: COMMERCIAL

## 2019-08-26 VITALS
RESPIRATION RATE: 16 BRPM | DIASTOLIC BLOOD PRESSURE: 74 MMHG | SYSTOLIC BLOOD PRESSURE: 120 MMHG | HEART RATE: 78 BPM | TEMPERATURE: 97.8 F

## 2019-08-26 PROBLEM — Z3A.36 PREGNANCY WITH 36 COMPLETED WEEKS GESTATION: Status: ACTIVE | Noted: 2019-08-26

## 2019-08-26 LAB
BILIRUBIN URINE: NEGATIVE
BLOOD, URINE: NEGATIVE
CLARITY: CLEAR
COLOR: YELLOW
GLUCOSE URINE: NEGATIVE MG/DL
KETONES, URINE: NEGATIVE MG/DL
LEUKOCYTE ESTERASE, URINE: NEGATIVE
NITRITE, URINE: NEGATIVE
PH UA: 7 (ref 5–9)
PROTEIN UA: NEGATIVE MG/DL
SPECIFIC GRAVITY UA: 1.02 (ref 1–1.03)
UROBILINOGEN, URINE: 0.2 E.U./DL

## 2019-08-26 PROCEDURE — 2580000003 HC RX 258: Performed by: OBSTETRICS & GYNECOLOGY

## 2019-08-26 PROCEDURE — G0378 HOSPITAL OBSERVATION PER HR: HCPCS

## 2019-08-26 PROCEDURE — 59025 FETAL NON-STRESS TEST: CPT

## 2019-08-26 PROCEDURE — 36415 COLL VENOUS BLD VENIPUNCTURE: CPT

## 2019-08-26 PROCEDURE — 99201 HC NEW PT, OUTPT VISIT LEVEL 1: CPT

## 2019-08-26 PROCEDURE — 99214 OFFICE O/P EST MOD 30 MIN: CPT | Performed by: OBSTETRICS & GYNECOLOGY

## 2019-08-26 PROCEDURE — 81003 URINALYSIS AUTO W/O SCOPE: CPT

## 2019-08-26 RX ORDER — SODIUM CHLORIDE, SODIUM LACTATE, POTASSIUM CHLORIDE, AND CALCIUM CHLORIDE .6; .31; .03; .02 G/100ML; G/100ML; G/100ML; G/100ML
300 INJECTION, SOLUTION INTRAVENOUS ONCE
Status: COMPLETED | OUTPATIENT
Start: 2019-08-26 | End: 2019-08-26

## 2019-08-26 RX ORDER — SODIUM CHLORIDE, SODIUM LACTATE, POTASSIUM CHLORIDE, CALCIUM CHLORIDE 600; 310; 30; 20 MG/100ML; MG/100ML; MG/100ML; MG/100ML
INJECTION, SOLUTION INTRAVENOUS CONTINUOUS
Status: DISCONTINUED | OUTPATIENT
Start: 2019-08-26 | End: 2019-08-26 | Stop reason: HOSPADM

## 2019-08-26 RX ADMIN — SODIUM CHLORIDE, POTASSIUM CHLORIDE, SODIUM LACTATE AND CALCIUM CHLORIDE: 600; 310; 30; 20 INJECTION, SOLUTION INTRAVENOUS at 02:25

## 2019-08-26 RX ADMIN — SODIUM CHLORIDE, POTASSIUM CHLORIDE, SODIUM LACTATE AND CALCIUM CHLORIDE 1000 ML: 600; 310; 30; 20 INJECTION, SOLUTION INTRAVENOUS at 02:00

## 2019-08-26 NOTE — H&P
Department of Obstetrics and Gynecology  Labor and Delivery  Triage Note      SUBJECTIVE:  Prudencio Oropeza is a 25 y.o. female, [id number], Patient's last menstrual period was 11/15/2018., Estimated Date of Delivery: 19, 36w4d, here with the complaint of uterine contractions started this morning but became stronger and more regular this evening. She denies having any vaginal bleeding or leakage of fluid. She reports feeling good fetal movement. She reports urinary frequency, urinating once or twice an hour. Prenatal course: She has a history of delivery at 36 weeks and therefore has been on progesterone injection weekly. The last injection she received was on . Scheduled to receive another injection on the   She was treated for UTI 2 weeks ago        Review of Systems:   Ears, nose, mouth, throat, and face: negative  Respiratory: negative  Cardiovascular: negative  Gastrointestinal: negative  Genitourinary:Uterine contractions. Urinary frequency. Integument/breast: negative  Hematologic/lymphatic: negative  Musculoskeletal:negative  Neurological: negative  Behavioral/Psych: negative  Endocrine: negative  Allergic/Immunologic: negative    OBJECTIVE    Vitals:  /74   Pulse 78   Temp 97.8 °F (36.6 °C) (Oral)   Resp 16   LMP 11/15/2018       Abdomen: Gravid, soft, nontender. Vaginal Exam:  Cervical dilatation: Fingertip to 1 cm, Effacement: 65 to 70%, soft, Station: -2, Presentation: Vertex, Membranes: Intact    Fetal heart rate:         Baseline Heart Rate: 135       Accelerations:  present       Decelerations:  absent       Variability:  moderate    Contraction frequency: 3 minutes         ASSESSMENT:      25year-old female -1-1-1 36 weeks 4 days complaining of uterine contractions. She also reports having urinary frequency. Patient has a history of delivery at 36 weeks and has been on progesterone injection.     Plan: Admit observation, continue fetal monitoring, urine analysis, bolus IV fluid.     Call placed for Dr Jose Capps [facility]    Case discussed with Dr. Jose Capps

## 2019-09-01 ENCOUNTER — HOSPITAL ENCOUNTER (OUTPATIENT)
Age: 24
Setting detail: OBSERVATION
Discharge: HOME | End: 2019-09-01
Attending: OBSTETRICS & GYNECOLOGY | Admitting: OBSTETRICS & GYNECOLOGY
Payer: COMMERCIAL

## 2019-09-01 VITALS
WEIGHT: 160 LBS | BODY MASS INDEX: 28.35 KG/M2 | DIASTOLIC BLOOD PRESSURE: 82 MMHG | SYSTOLIC BLOOD PRESSURE: 133 MMHG | HEIGHT: 63 IN | HEART RATE: 104 BPM | TEMPERATURE: 98.1 F | RESPIRATION RATE: 16 BRPM

## 2019-09-01 PROBLEM — Z64.1 MULTIPARITY: Status: ACTIVE | Noted: 2019-09-01

## 2019-09-01 PROCEDURE — 36415 COLL VENOUS BLD VENIPUNCTURE: CPT

## 2019-09-01 PROCEDURE — G0378 HOSPITAL OBSERVATION PER HR: HCPCS

## 2019-09-01 PROCEDURE — 99211 OFF/OP EST MAY X REQ PHY/QHP: CPT

## 2019-09-01 RX ORDER — SODIUM CHLORIDE, SODIUM LACTATE, POTASSIUM CHLORIDE, AND CALCIUM CHLORIDE .6; .31; .03; .02 G/100ML; G/100ML; G/100ML; G/100ML
500 INJECTION, SOLUTION INTRAVENOUS ONCE
Status: DISCONTINUED | OUTPATIENT
Start: 2019-09-01 | End: 2019-09-02 | Stop reason: HOSPADM

## 2019-09-01 RX ORDER — SODIUM CHLORIDE, SODIUM LACTATE, POTASSIUM CHLORIDE, CALCIUM CHLORIDE 600; 310; 30; 20 MG/100ML; MG/100ML; MG/100ML; MG/100ML
INJECTION, SOLUTION INTRAVENOUS CONTINUOUS
Status: DISCONTINUED | OUTPATIENT
Start: 2019-09-01 | End: 2019-09-02 | Stop reason: HOSPADM

## 2019-09-01 NOTE — PROGRESS NOTES
Patient presents to labor and delivery iup @37.3  with complaints of contractions. Has had them inconsistently since Tuesday but have gotten worst today.  Denies LOF and VB. +FM

## 2019-09-01 NOTE — PROGRESS NOTES
Updated Dr Mary Alice Rodas, orders for iv fluid bolus 500 cc. Lactated ringers 150 ml/hr. Recheck patient in an hour.  Daisy Balderrama

## 2019-09-01 NOTE — H&P
Department of Obstetrics and Gynecology  Attending Obstetrics History and Physical      HISTORY OF PRESENT ILLNESS:      The patient is a 25 y.o.  3 parity 1 at 37 weeks 3 days. Complaining of ctx's. States cx was 1cm    Estimated Due Date:  19  Contractions:  Yes  Leaking of fluid: no  nfm  Blleeding:  No    PRENATAL CARE:    Complications: No      Active Problems:    * No active hospital problems. *  Resolved Problems:    * No resolved hospital problems. *        PAST OB HISTORY    OB History    Para Term  AB Living   3 1   1 1 1   SAB TAB Ectopic Molar Multiple Live Births   1         1      # Outcome Date GA Lbr Delvin/2nd Weight Sex Delivery Anes PTL Lv   3 Current            2 SAB            1  12 36w4d  5 lb 6 oz (2.438 kg) F Vag-Spont  N BRENDON      Birth Comments: PTL           Pre-eclampsia:  No      :  No      D & C:  No      Cerclage:  No      LEEP:  No      Myomectomy:  No       Labor: yes    Past Medical History:    No past medical history on file. Past Surgical History:    No past surgical history on file. Past Family History:  No family history on file. ROS:  Const: No fever, chills, night sweats, no recent unexplained weight gain/loss  HEENT: No blurred vision, double vision; no ear problems; no sore throat, congestion; no running nose. Resp: No cough, no sputum, no pleuritic chest pain, no sob  Cardio: No chest pain, no exertional dyspnea, no PND, no orthopnea, no palpitation, no leg swelling. GI: No dysphagia, no reflux; no abdominal pain, no n/v; no c/d.  No hematochezia    : No dysuria, no frequency, hesitancy; no hematuria  MSK: no joint pain, no myalgia, no change in ROM  Neuro: no focal weakness in extremities, no slurred speech, no double vision, no numbness or tingling in extremities  Endo: no heat/cold intolerance, no polyphagia, polydipsia or polyuria  Hem: no increased bleeding, no bruising, no lymphadenopathy  Skin: no

## 2019-09-03 ENCOUNTER — HOSPITAL ENCOUNTER (OUTPATIENT)
Age: 24
Discharge: HOME | End: 2019-09-03
Attending: OBSTETRICS & GYNECOLOGY | Admitting: OBSTETRICS & GYNECOLOGY
Payer: COMMERCIAL

## 2019-09-03 VITALS — TEMPERATURE: 98.3 F | RESPIRATION RATE: 16 BRPM | DIASTOLIC BLOOD PRESSURE: 73 MMHG | SYSTOLIC BLOOD PRESSURE: 126 MMHG

## 2019-09-03 PROBLEM — Z3A.37 37 WEEKS GESTATION OF PREGNANCY: Status: ACTIVE | Noted: 2019-09-03

## 2019-09-03 LAB
AMNISURE, POC: NEGATIVE
Lab: NORMAL
NEGATIVE QC PASS/FAIL: NORMAL
POSITIVE QC PASS/FAIL: NORMAL

## 2019-09-03 PROCEDURE — 99211 OFF/OP EST MAY X REQ PHY/QHP: CPT

## 2019-09-03 PROCEDURE — 99213 OFFICE O/P EST LOW 20 MIN: CPT | Performed by: OBSTETRICS & GYNECOLOGY

## 2019-09-03 PROCEDURE — 84112 EVAL AMNIOTIC FLUID PROTEIN: CPT

## 2019-09-03 NOTE — PROGRESS NOTES
Department of Obstetrics and Gynecology  Labor and Delivery  Triage Note      SUBJECTIVE:  Anish Price is a 25 y.o. female, [id number], Patient's last menstrual period was 11/15/2018., Estimated Date of Delivery: 9/19/19, 37w5d, here with the complaint of leakage of fluid and contractions.  No vaginal bleeding    Prenatal course: uncomplicated    Review of Systems:   Ears, nose, mouth, throat, and face: negative  Respiratory: negative  Cardiovascular: negative  Gastrointestinal: negative  Genitourinary:negative  Integument/breast: negative  Hematologic/lymphatic: negative  Musculoskeletal:negative  Neurological: negative  Behavioral/Psych: negative  Endocrine: negative  Allergic/Immunologic: negative    OBJECTIVE    Vitals:  /73   Temp 98.3 °F (36.8 °C) (Oral)   Resp 16   LMP 11/15/2018     General- alert, NAD  Skin- warm and dry, no rashes seen  Psych- normal mood and affect  Neuro- CN II-XII grossly intact  Abdomen: soft, NT/ND, gravid    Vaginal Exam:  Cervical dilatation: 2, Effacement: 60, Station: -3, Presentation: cephalic, Membranes: intact    Fetal heart rate:         Baseline Heart Rate:  135        Accelerations:  present       Decelerations:  absent       Variability:  moderate    Contraction frequency: q3-4 minutes    ASSESSMENT:    Leakage of fluid- amnisure negative  Threatened labor at term- cervix unchanged  fhr Cat I    Plan: d/w Dr. Ni Escamilla  discharge

## 2019-09-11 ENCOUNTER — HOSPITAL ENCOUNTER (INPATIENT)
Age: 24
LOS: 2 days | Discharge: HOME | DRG: 540 | End: 2019-09-13
Attending: OBSTETRICS & GYNECOLOGY | Admitting: OBSTETRICS & GYNECOLOGY
Payer: COMMERCIAL

## 2019-09-11 ENCOUNTER — ANESTHESIA EVENT (OUTPATIENT)
Dept: LABOR AND DELIVERY | Age: 24
DRG: 540 | End: 2019-09-11
Payer: COMMERCIAL

## 2019-09-11 ENCOUNTER — ANESTHESIA (OUTPATIENT)
Dept: LABOR AND DELIVERY | Age: 24
DRG: 540 | End: 2019-09-11
Payer: COMMERCIAL

## 2019-09-11 VITALS
SYSTOLIC BLOOD PRESSURE: 128 MMHG | RESPIRATION RATE: 2 BRPM | TEMPERATURE: 89.2 F | OXYGEN SATURATION: 98 % | DIASTOLIC BLOOD PRESSURE: 78 MMHG

## 2019-09-11 DIAGNOSIS — G89.18 POST-OPERATIVE PAIN: Primary | ICD-10-CM

## 2019-09-11 PROBLEM — O26.899 ABDOMINAL CRAMPING AFFECTING PREGNANCY, ANTEPARTUM: Status: ACTIVE | Noted: 2019-09-11

## 2019-09-11 PROBLEM — R10.9 ABDOMINAL CRAMPING AFFECTING PREGNANCY, ANTEPARTUM: Status: ACTIVE | Noted: 2019-09-11

## 2019-09-11 LAB
ABO/RH: NORMAL
AMPHETAMINE SCREEN, URINE: NOT DETECTED
ANTIBODY SCREEN: NORMAL
BARBITURATE SCREEN URINE: NOT DETECTED
BENZODIAZEPINE SCREEN, URINE: NOT DETECTED
CANNABINOID SCREEN URINE: NOT DETECTED
COCAINE METABOLITE SCREEN URINE: NOT DETECTED
HCT VFR BLD CALC: 39.9 % (ref 34–48)
HEMOGLOBIN: 13 G/DL (ref 11.5–15.5)
Lab: NORMAL
MCH RBC QN AUTO: 28.9 PG (ref 26–35)
MCHC RBC AUTO-ENTMCNC: 32.6 % (ref 32–34.5)
MCV RBC AUTO: 88.7 FL (ref 80–99.9)
METHADONE SCREEN, URINE: NOT DETECTED
OPIATE SCREEN URINE: NOT DETECTED
PDW BLD-RTO: 13.2 FL (ref 11.5–15)
PHENCYCLIDINE SCREEN URINE: NOT DETECTED
PLATELET # BLD: 224 E9/L (ref 130–450)
PMV BLD AUTO: 12.6 FL (ref 7–12)
PROPOXYPHENE SCREEN: NOT DETECTED
RBC # BLD: 4.5 E12/L (ref 3.5–5.5)
WBC # BLD: 22.1 E9/L (ref 4.5–11.5)

## 2019-09-11 PROCEDURE — 2580000003 HC RX 258: Performed by: NURSE PRACTITIONER

## 2019-09-11 PROCEDURE — 86900 BLOOD TYPING SEROLOGIC ABO: CPT

## 2019-09-11 PROCEDURE — 1220000000 HC SEMI PRIVATE OB R&B

## 2019-09-11 PROCEDURE — 6370000000 HC RX 637 (ALT 250 FOR IP): Performed by: OBSTETRICS & GYNECOLOGY

## 2019-09-11 PROCEDURE — 80307 DRUG TEST PRSMV CHEM ANLYZR: CPT

## 2019-09-11 PROCEDURE — 6360000002 HC RX W HCPCS: Performed by: NURSE ANESTHETIST, CERTIFIED REGISTERED

## 2019-09-11 PROCEDURE — 3700000000 HC ANESTHESIA ATTENDED CARE: Performed by: OBSTETRICS & GYNECOLOGY

## 2019-09-11 PROCEDURE — 6360000002 HC RX W HCPCS: Performed by: OBSTETRICS & GYNECOLOGY

## 2019-09-11 PROCEDURE — 36415 COLL VENOUS BLD VENIPUNCTURE: CPT

## 2019-09-11 PROCEDURE — 86901 BLOOD TYPING SEROLOGIC RH(D): CPT

## 2019-09-11 PROCEDURE — 2580000003 HC RX 258: Performed by: OBSTETRICS & GYNECOLOGY

## 2019-09-11 PROCEDURE — 85027 COMPLETE CBC AUTOMATED: CPT

## 2019-09-11 PROCEDURE — 6360000002 HC RX W HCPCS: Performed by: ANESTHESIOLOGY

## 2019-09-11 PROCEDURE — 10907ZC DRAINAGE OF AMNIOTIC FLUID, THERAPEUTIC FROM PRODUCTS OF CONCEPTION, VIA NATURAL OR ARTIFICIAL OPENING: ICD-10-PCS | Performed by: OBSTETRICS & GYNECOLOGY

## 2019-09-11 PROCEDURE — 3609079900 HC CESAREAN SECTION: Performed by: OBSTETRICS & GYNECOLOGY

## 2019-09-11 PROCEDURE — 2580000003 HC RX 258: Performed by: NURSE ANESTHETIST, CERTIFIED REGISTERED

## 2019-09-11 PROCEDURE — 6360000002 HC RX W HCPCS

## 2019-09-11 PROCEDURE — 2709999900 HC NON-CHARGEABLE SUPPLY: Performed by: OBSTETRICS & GYNECOLOGY

## 2019-09-11 PROCEDURE — 7100000001 HC PACU RECOVERY - ADDTL 15 MIN: Performed by: OBSTETRICS & GYNECOLOGY

## 2019-09-11 PROCEDURE — 2500000003 HC RX 250 WO HCPCS: Performed by: NURSE ANESTHETIST, CERTIFIED REGISTERED

## 2019-09-11 PROCEDURE — 7100000000 HC PACU RECOVERY - FIRST 15 MIN: Performed by: OBSTETRICS & GYNECOLOGY

## 2019-09-11 PROCEDURE — 3700000001 HC ADD 15 MINUTES (ANESTHESIA): Performed by: OBSTETRICS & GYNECOLOGY

## 2019-09-11 PROCEDURE — 86850 RBC ANTIBODY SCREEN: CPT

## 2019-09-11 RX ORDER — OXYCODONE HYDROCHLORIDE AND ACETAMINOPHEN 5; 325 MG/1; MG/1
2 TABLET ORAL EVERY 4 HOURS PRN
Status: DISCONTINUED | OUTPATIENT
Start: 2019-09-12 | End: 2019-09-11

## 2019-09-11 RX ORDER — PROMETHAZINE HYDROCHLORIDE 25 MG/ML
6.25 INJECTION, SOLUTION INTRAMUSCULAR; INTRAVENOUS
Status: DISCONTINUED | OUTPATIENT
Start: 2019-09-11 | End: 2019-09-11 | Stop reason: HOSPADM

## 2019-09-11 RX ORDER — PROPOFOL 10 MG/ML
INJECTION, EMULSION INTRAVENOUS PRN
Status: DISCONTINUED | OUTPATIENT
Start: 2019-09-11 | End: 2019-09-11 | Stop reason: SDUPTHER

## 2019-09-11 RX ORDER — LANOLIN 100 %
OINTMENT (GRAM) TOPICAL
Status: DISCONTINUED | OUTPATIENT
Start: 2019-09-11 | End: 2019-09-13 | Stop reason: HOSPADM

## 2019-09-11 RX ORDER — OXYCODONE HYDROCHLORIDE AND ACETAMINOPHEN 5; 325 MG/1; MG/1
1 TABLET ORAL EVERY 4 HOURS PRN
Status: DISCONTINUED | OUTPATIENT
Start: 2019-09-11 | End: 2019-09-13 | Stop reason: HOSPADM

## 2019-09-11 RX ORDER — FERROUS SULFATE 325(65) MG
325 TABLET ORAL 2 TIMES DAILY WITH MEALS
Status: DISCONTINUED | OUTPATIENT
Start: 2019-09-11 | End: 2019-09-13 | Stop reason: HOSPADM

## 2019-09-11 RX ORDER — OXYCODONE HYDROCHLORIDE AND ACETAMINOPHEN 5; 325 MG/1; MG/1
2 TABLET ORAL EVERY 4 HOURS PRN
Status: DISCONTINUED | OUTPATIENT
Start: 2019-09-11 | End: 2019-09-13 | Stop reason: HOSPADM

## 2019-09-11 RX ORDER — OXYCODONE HYDROCHLORIDE AND ACETAMINOPHEN 5; 325 MG/1; MG/1
1 TABLET ORAL EVERY 4 HOURS PRN
Status: DISCONTINUED | OUTPATIENT
Start: 2019-09-12 | End: 2019-09-11

## 2019-09-11 RX ORDER — LIDOCAINE HYDROCHLORIDE 10 MG/ML
INJECTION, SOLUTION INFILTRATION; PERINEURAL
Status: DISPENSED
Start: 2019-09-11 | End: 2019-09-11

## 2019-09-11 RX ORDER — SODIUM CHLORIDE, SODIUM LACTATE, POTASSIUM CHLORIDE, CALCIUM CHLORIDE 600; 310; 30; 20 MG/100ML; MG/100ML; MG/100ML; MG/100ML
INJECTION, SOLUTION INTRAVENOUS CONTINUOUS PRN
Status: DISCONTINUED | OUTPATIENT
Start: 2019-09-11 | End: 2019-09-11 | Stop reason: SDUPTHER

## 2019-09-11 RX ORDER — ONDANSETRON 2 MG/ML
INJECTION INTRAMUSCULAR; INTRAVENOUS PRN
Status: DISCONTINUED | OUTPATIENT
Start: 2019-09-11 | End: 2019-09-11 | Stop reason: SDUPTHER

## 2019-09-11 RX ORDER — ONDANSETRON 2 MG/ML
4 INJECTION INTRAMUSCULAR; INTRAVENOUS EVERY 6 HOURS PRN
Status: DISCONTINUED | OUTPATIENT
Start: 2019-09-11 | End: 2019-09-13 | Stop reason: HOSPADM

## 2019-09-11 RX ORDER — PRENATAL WITH FERROUS FUM AND FOLIC ACID 3080; 920; 120; 400; 22; 1.84; 3; 20; 10; 1; 12; 200; 27; 25; 2 [IU]/1; [IU]/1; MG/1; [IU]/1; MG/1; MG/1; MG/1; MG/1; MG/1; MG/1; UG/1; MG/1; MG/1; MG/1; MG/1
1 TABLET ORAL DAILY
Status: DISCONTINUED | OUTPATIENT
Start: 2019-09-11 | End: 2019-09-13 | Stop reason: HOSPADM

## 2019-09-11 RX ORDER — SODIUM CHLORIDE, SODIUM LACTATE, POTASSIUM CHLORIDE, CALCIUM CHLORIDE 600; 310; 30; 20 MG/100ML; MG/100ML; MG/100ML; MG/100ML
INJECTION, SOLUTION INTRAVENOUS CONTINUOUS
Status: DISCONTINUED | OUTPATIENT
Start: 2019-09-11 | End: 2019-09-13 | Stop reason: HOSPADM

## 2019-09-11 RX ORDER — DEXAMETHASONE SODIUM PHOSPHATE 4 MG/ML
INJECTION, SOLUTION INTRA-ARTICULAR; INTRALESIONAL; INTRAMUSCULAR; INTRAVENOUS; SOFT TISSUE PRN
Status: DISCONTINUED | OUTPATIENT
Start: 2019-09-11 | End: 2019-09-11 | Stop reason: SDUPTHER

## 2019-09-11 RX ORDER — FENTANYL CITRATE 50 UG/ML
INJECTION, SOLUTION INTRAMUSCULAR; INTRAVENOUS PRN
Status: DISCONTINUED | OUTPATIENT
Start: 2019-09-11 | End: 2019-09-11 | Stop reason: SDUPTHER

## 2019-09-11 RX ORDER — ACETAMINOPHEN 325 MG/1
650 TABLET ORAL EVERY 4 HOURS PRN
Status: DISCONTINUED | OUTPATIENT
Start: 2019-09-11 | End: 2019-09-13 | Stop reason: HOSPADM

## 2019-09-11 RX ORDER — ACETAMINOPHEN 650 MG
TABLET, EXTENDED RELEASE ORAL
Status: DISPENSED
Start: 2019-09-11 | End: 2019-09-11

## 2019-09-11 RX ORDER — SODIUM CHLORIDE 0.9 % (FLUSH) 0.9 %
10 SYRINGE (ML) INJECTION EVERY 12 HOURS SCHEDULED
Status: DISCONTINUED | OUTPATIENT
Start: 2019-09-11 | End: 2019-09-13 | Stop reason: HOSPADM

## 2019-09-11 RX ORDER — KETOROLAC TROMETHAMINE 30 MG/ML
30 INJECTION, SOLUTION INTRAMUSCULAR; INTRAVENOUS EVERY 6 HOURS
Status: DISPENSED | OUTPATIENT
Start: 2019-09-11 | End: 2019-09-12

## 2019-09-11 RX ORDER — SUCCINYLCHOLINE/SOD CL,ISO/PF 200MG/10ML
SYRINGE (ML) INTRAVENOUS PRN
Status: DISCONTINUED | OUTPATIENT
Start: 2019-09-11 | End: 2019-09-11 | Stop reason: SDUPTHER

## 2019-09-11 RX ORDER — SIMETHICONE 80 MG
80 TABLET,CHEWABLE ORAL EVERY 6 HOURS PRN
Status: DISCONTINUED | OUTPATIENT
Start: 2019-09-11 | End: 2019-09-13 | Stop reason: HOSPADM

## 2019-09-11 RX ORDER — ONDANSETRON 2 MG/ML
INJECTION INTRAMUSCULAR; INTRAVENOUS PRN
Status: DISCONTINUED | OUTPATIENT
Start: 2019-09-11 | End: 2019-09-11

## 2019-09-11 RX ORDER — CEFAZOLIN SODIUM 1 G/3ML
INJECTION, POWDER, FOR SOLUTION INTRAMUSCULAR; INTRAVENOUS PRN
Status: DISCONTINUED | OUTPATIENT
Start: 2019-09-11 | End: 2019-09-11 | Stop reason: SDUPTHER

## 2019-09-11 RX ORDER — SODIUM CHLORIDE 0.9 % (FLUSH) 0.9 %
10 SYRINGE (ML) INJECTION PRN
Status: DISCONTINUED | OUTPATIENT
Start: 2019-09-11 | End: 2019-09-13 | Stop reason: HOSPADM

## 2019-09-11 RX ORDER — ONDANSETRON 2 MG/ML
4 INJECTION INTRAMUSCULAR; INTRAVENOUS EVERY 6 HOURS PRN
Status: DISCONTINUED | OUTPATIENT
Start: 2019-09-11 | End: 2019-09-11 | Stop reason: HOSPADM

## 2019-09-11 RX ORDER — DOCUSATE SODIUM 100 MG/1
100 CAPSULE, LIQUID FILLED ORAL 2 TIMES DAILY
Status: DISCONTINUED | OUTPATIENT
Start: 2019-09-11 | End: 2019-09-13 | Stop reason: HOSPADM

## 2019-09-11 RX ORDER — KETOROLAC TROMETHAMINE 30 MG/ML
30 INJECTION, SOLUTION INTRAMUSCULAR; INTRAVENOUS EVERY 6 HOURS
Status: DISCONTINUED | OUTPATIENT
Start: 2019-09-11 | End: 2019-09-11

## 2019-09-11 RX ADMIN — Medication 2 MG: at 07:26

## 2019-09-11 RX ADMIN — OXYCODONE HYDROCHLORIDE AND ACETAMINOPHEN 1 TABLET: 5; 325 TABLET ORAL at 20:02

## 2019-09-11 RX ADMIN — FENTANYL CITRATE 50 MCG: 50 INJECTION, SOLUTION INTRAMUSCULAR; INTRAVENOUS at 09:06

## 2019-09-11 RX ADMIN — DOCUSATE SODIUM 100 MG: 100 CAPSULE, LIQUID FILLED ORAL at 21:28

## 2019-09-11 RX ADMIN — FENTANYL CITRATE 50 MCG: 50 INJECTION, SOLUTION INTRAMUSCULAR; INTRAVENOUS at 09:00

## 2019-09-11 RX ADMIN — BUTORPHANOL TARTRATE 2 MG: 2 INJECTION, SOLUTION INTRAMUSCULAR; INTRAVENOUS at 07:26

## 2019-09-11 RX ADMIN — Medication 120 MG: at 08:57

## 2019-09-11 RX ADMIN — SODIUM CHLORIDE, POTASSIUM CHLORIDE, SODIUM LACTATE AND CALCIUM CHLORIDE: 600; 310; 30; 20 INJECTION, SOLUTION INTRAVENOUS at 08:02

## 2019-09-11 RX ADMIN — HYDROMORPHONE HYDROCHLORIDE 0.5 MG: 1 INJECTION, SOLUTION INTRAMUSCULAR; INTRAVENOUS; SUBCUTANEOUS at 10:53

## 2019-09-11 RX ADMIN — Medication 166 ML: at 09:01

## 2019-09-11 RX ADMIN — HYDROMORPHONE HYDROCHLORIDE 0.5 MG: 1 INJECTION, SOLUTION INTRAMUSCULAR; INTRAVENOUS; SUBCUTANEOUS at 11:38

## 2019-09-11 RX ADMIN — FENTANYL CITRATE 50 MCG: 50 INJECTION, SOLUTION INTRAMUSCULAR; INTRAVENOUS at 09:40

## 2019-09-11 RX ADMIN — ONDANSETRON HYDROCHLORIDE 4 MG: 2 INJECTION, SOLUTION INTRAMUSCULAR; INTRAVENOUS at 09:06

## 2019-09-11 RX ADMIN — DEXTROSE MONOHYDRATE 1 G: 5 INJECTION INTRAVENOUS at 20:16

## 2019-09-11 RX ADMIN — SODIUM CHLORIDE, POTASSIUM CHLORIDE, SODIUM LACTATE AND CALCIUM CHLORIDE: 600; 310; 30; 20 INJECTION, SOLUTION INTRAVENOUS at 09:24

## 2019-09-11 RX ADMIN — KETOROLAC TROMETHAMINE 30 MG: 30 INJECTION, SOLUTION INTRAMUSCULAR at 11:52

## 2019-09-11 RX ADMIN — Medication 10 ML: at 22:54

## 2019-09-11 RX ADMIN — DEXAMETHASONE SODIUM PHOSPHATE 8 MG: 4 INJECTION, SOLUTION INTRA-ARTICULAR; INTRALESIONAL; INTRAMUSCULAR; INTRAVENOUS; SOFT TISSUE at 09:06

## 2019-09-11 RX ADMIN — Medication 2 MG: at 04:00

## 2019-09-11 RX ADMIN — HYDROMORPHONE HYDROCHLORIDE 0.5 MG: 1 INJECTION, SOLUTION INTRAMUSCULAR; INTRAVENOUS; SUBCUTANEOUS at 10:03

## 2019-09-11 RX ADMIN — SIMETHICONE CHEW TAB 80 MG 80 MG: 80 TABLET ORAL at 18:10

## 2019-09-11 RX ADMIN — BUTORPHANOL TARTRATE 2 MG: 2 INJECTION, SOLUTION INTRAMUSCULAR; INTRAVENOUS at 04:00

## 2019-09-11 RX ADMIN — PROPOFOL 170 MG: 10 INJECTION, EMULSION INTRAVENOUS at 08:57

## 2019-09-11 RX ADMIN — KETOROLAC TROMETHAMINE 30 MG: 30 INJECTION, SOLUTION INTRAMUSCULAR at 18:09

## 2019-09-11 RX ADMIN — HYDROMORPHONE HYDROCHLORIDE 0.5 MG: 1 INJECTION, SOLUTION INTRAMUSCULAR; INTRAVENOUS; SUBCUTANEOUS at 10:18

## 2019-09-11 RX ADMIN — SODIUM CHLORIDE, POTASSIUM CHLORIDE, SODIUM LACTATE AND CALCIUM CHLORIDE: 600; 310; 30; 20 INJECTION, SOLUTION INTRAVENOUS at 08:54

## 2019-09-11 RX ADMIN — CEFAZOLIN 2000 MG: 1 INJECTION, POWDER, FOR SOLUTION INTRAMUSCULAR; INTRAVENOUS at 09:18

## 2019-09-11 RX ADMIN — SODIUM CHLORIDE, POTASSIUM CHLORIDE, SODIUM LACTATE AND CALCIUM CHLORIDE: 600; 310; 30; 20 INJECTION, SOLUTION INTRAVENOUS at 03:30

## 2019-09-11 RX ADMIN — FENTANYL CITRATE 50 MCG: 50 INJECTION, SOLUTION INTRAMUSCULAR; INTRAVENOUS at 09:38

## 2019-09-11 ASSESSMENT — PULMONARY FUNCTION TESTS
PIF_VALUE: 3
PIF_VALUE: 0
PIF_VALUE: 12
PIF_VALUE: 0
PIF_VALUE: 23
PIF_VALUE: 2
PIF_VALUE: 16
PIF_VALUE: 12
PIF_VALUE: 1
PIF_VALUE: 1
PIF_VALUE: 20
PIF_VALUE: 16
PIF_VALUE: 0
PIF_VALUE: 16
PIF_VALUE: 14
PIF_VALUE: 24
PIF_VALUE: 0
PIF_VALUE: 23
PIF_VALUE: 0
PIF_VALUE: 16
PIF_VALUE: 7
PIF_VALUE: 0
PIF_VALUE: 12
PIF_VALUE: 2
PIF_VALUE: 1
PIF_VALUE: 1
PIF_VALUE: 17
PIF_VALUE: 16
PIF_VALUE: 13
PIF_VALUE: 1
PIF_VALUE: 0
PIF_VALUE: 25
PIF_VALUE: 16
PIF_VALUE: 0
PIF_VALUE: 1
PIF_VALUE: 21
PIF_VALUE: 22
PIF_VALUE: 0
PIF_VALUE: 13
PIF_VALUE: 13
PIF_VALUE: 8
PIF_VALUE: 14
PIF_VALUE: 0
PIF_VALUE: 17
PIF_VALUE: 13
PIF_VALUE: 12
PIF_VALUE: 0
PIF_VALUE: 0
PIF_VALUE: 1
PIF_VALUE: 16

## 2019-09-11 ASSESSMENT — PAIN SCALES - GENERAL
PAINLEVEL_OUTOF10: 3
PAINLEVEL_OUTOF10: 7
PAINLEVEL_OUTOF10: 9
PAINLEVEL_OUTOF10: 10
PAINLEVEL_OUTOF10: 10
PAINLEVEL_OUTOF10: 5
PAINLEVEL_OUTOF10: 7
PAINLEVEL_OUTOF10: 4
PAINLEVEL_OUTOF10: 7
PAINLEVEL_OUTOF10: 7

## 2019-09-11 ASSESSMENT — LIFESTYLE VARIABLES: SMOKING_STATUS: 1

## 2019-09-11 NOTE — L&D DELIVERY NOTE
PREOPERATIVE DIAGNOSES:     1. 38w6d intrauterine pregnancy. 2.  Fetal Bradycardia      POSTOPERATIVE DIAGNOSES:     Same.      PROCEDURE: primary low transverse  section        SURGEON: Abdirahman Valentin       ASSISTANT: Perla Lipscomb      ESTIMATED BLOOD LOSS:  922BV        COMPLICATIONS:  None.         ANESTHESIA:   General Endotracheal Anesthesia        FINDINGS:   Live Born; nuchal cord x 1  Sex: Male  Fetal Position:  Cephalic, occiput posterior  Apgars:  1 minute: 9; 5 minute: 9  Weight:  7 pounds, 2 ounces  Tubes, uterus, ovaries:  normal          DETAILS OF PROCEDURE:    The patient was taken to the operating room where General Endotracheal Anesthesia was administered and found to be adequate. Abdomen was prepped   and draped in the normal sterile fashion. [place] catheter had previously been   placed. Pfannenstiel skin incision made with a scalpel, carried down to the fascia with cautery. The fascia was nicked in the midline and extended laterally with   cautery. Muscles were  in the midline. Peritoneum was grasped with   hemostats, tented up, and entered sharply. Peritoneal incision was extended   superiorly and inferiorly with good visualization of bladder. Delee bladder blade was introduced. Bladder flap was created with sharp dissection. Low transverse uterine incision was made with a scalpel and extended bluntly. Membranes ruptured for Clear fluid. A viable male infant was delivered in the cephalic presentation without diffiuclty. Baby was bulb suctioned on the abdomen. Cord was clamped and cut, and handed to waiting R.N. Cord blood was obtained. Placenta was manually extracted with 3 vessel cord. Uterus was exteriorized, cleared of all clot and debris. Uterine incision   was closed with vicryl in a running locked fashion. Good hemostasis was   noted. Uterus, tubes, and ovaries appeared normal. Uterus was returned to   the pelvis.  The pelvis was irrigated, cleared of all clot and

## 2019-09-11 NOTE — PROGRESS NOTES
Patient presents to L&D IUP @ 38+6 weeks gestation  for CTX. Denies LOF, VB. States she feels baby movement. Was seen in Dr. Epi Ramirez office on Friday and is said to be 2cm.
Pt admitted and oriented to 323 via cart from [facility]. Bedside information reviewed w/ pt pt allergies and birthdate verified; pt states she already received tdap vacc; pt states hungry and denies nausea; inst on gradual return to diet as well as dietary measures to assist w bowel resumption; pt denies c/o discomfort; FOB at bedside.
Report given to [facility]
Updated dr. Nicol Mace on SVE.   No new orders
Ears, nose, mouth, throat, and face: negative  Respiratory: negative  Cardiovascular: negative  Gastrointestinal: negative  Genitourinary:negative  Integument/breast: negative  Hematologic/lymphatic: negative  Musculoskeletal:negative  Neurological: negative  Behavioral/Psych: negative  Endocrine: negative  Allergic/Immunologic: negative  Psychosocial: negative    PHYSICAL EXAM:    General appearance:  awake, alert, cooperative, no apparent distress, and appears stated age  Neurologic:  Awake, alert, oriented to name, place and time. Cranial nerves II-XII are grossly intact. Lungs:  clear to auscultation bilaterally  Heart:  regular rate and rhythm  Abdomen:   soft, non-distended, non-tender, no masses palpated, gravid  Fetal heart rate:  Baseline Heart Rate 130, accelerations:  present, decels:none  Pelvis:  External Genitalia: no lesions  Cervix:  DILATION:  4-5 cm  EFFACEMENT:   80%  STATION:  -2  CONSISTENCY:  soft    Contraction frequency:  2-3 minutes  Membranes:  Intact    Ht 5' 3\" (1.6 m)   Wt 160 lb (72.6 kg)   LMP 11/15/2018   BMI 28.34 kg/m²                 Prenatal Labs  Blood Type/Rh: O pos  Antibody Screen: negative  Rubella: immune  T.  Pallidum, IgG: non-reactive   Hepatitis B Surface Antigen: non-reactive   HIV: non-reactive   Sickle Cell Screen: not available  Gonorrhea: negative  Chlamydia: negative  Group B Strep: negative        ASSESSMENT AND PLAN:  D/w Dr. Laila Faulkner  Routine admission orders  stadol        Electronically signed by SHIVA Soto CNP on 9/11/2019 at 3:20 AM

## 2019-09-12 LAB
HCT VFR BLD CALC: 29.4 % (ref 34–48)
HEMOGLOBIN: 9.6 G/DL (ref 11.5–15.5)

## 2019-09-12 PROCEDURE — 36415 COLL VENOUS BLD VENIPUNCTURE: CPT

## 2019-09-12 PROCEDURE — 6360000002 HC RX W HCPCS: Performed by: OBSTETRICS & GYNECOLOGY

## 2019-09-12 PROCEDURE — 85014 HEMATOCRIT: CPT

## 2019-09-12 PROCEDURE — 1220000000 HC SEMI PRIVATE OB R&B

## 2019-09-12 PROCEDURE — 6370000000 HC RX 637 (ALT 250 FOR IP): Performed by: OBSTETRICS & GYNECOLOGY

## 2019-09-12 PROCEDURE — 2580000003 HC RX 258: Performed by: OBSTETRICS & GYNECOLOGY

## 2019-09-12 PROCEDURE — 85018 HEMOGLOBIN: CPT

## 2019-09-12 RX ADMIN — Medication 10 ML: at 06:06

## 2019-09-12 RX ADMIN — Medication 10 ML: at 00:09

## 2019-09-12 RX ADMIN — FERROUS SULFATE TAB 325 MG (65 MG ELEMENTAL FE) 325 MG: 325 (65 FE) TAB at 19:48

## 2019-09-12 RX ADMIN — FERROUS SULFATE TAB 325 MG (65 MG ELEMENTAL FE) 325 MG: 325 (65 FE) TAB at 09:38

## 2019-09-12 RX ADMIN — DOCUSATE SODIUM 100 MG: 100 CAPSULE, LIQUID FILLED ORAL at 09:39

## 2019-09-12 RX ADMIN — DEXTROSE MONOHYDRATE 1 G: 5 INJECTION INTRAVENOUS at 04:16

## 2019-09-12 RX ADMIN — OXYCODONE HYDROCHLORIDE AND ACETAMINOPHEN 2 TABLET: 5; 325 TABLET ORAL at 15:12

## 2019-09-12 RX ADMIN — OXYCODONE HYDROCHLORIDE AND ACETAMINOPHEN 1 TABLET: 5; 325 TABLET ORAL at 23:37

## 2019-09-12 RX ADMIN — OXYCODONE HYDROCHLORIDE AND ACETAMINOPHEN 1 TABLET: 5; 325 TABLET ORAL at 19:47

## 2019-09-12 RX ADMIN — OXYCODONE HYDROCHLORIDE AND ACETAMINOPHEN 1 TABLET: 5; 325 TABLET ORAL at 03:14

## 2019-09-12 RX ADMIN — OXYCODONE HYDROCHLORIDE AND ACETAMINOPHEN 1 TABLET: 5; 325 TABLET ORAL at 09:39

## 2019-09-12 RX ADMIN — Medication 10 ML: at 04:58

## 2019-09-12 RX ADMIN — SIMETHICONE CHEW TAB 80 MG 80 MG: 80 TABLET ORAL at 09:39

## 2019-09-12 RX ADMIN — KETOROLAC TROMETHAMINE 30 MG: 30 INJECTION, SOLUTION INTRAMUSCULAR at 00:09

## 2019-09-12 RX ADMIN — KETOROLAC TROMETHAMINE 30 MG: 30 INJECTION, SOLUTION INTRAMUSCULAR at 06:06

## 2019-09-12 RX ADMIN — DOCUSATE SODIUM 100 MG: 100 CAPSULE, LIQUID FILLED ORAL at 19:48

## 2019-09-12 ASSESSMENT — PAIN SCALES - GENERAL
PAINLEVEL_OUTOF10: 5
PAINLEVEL_OUTOF10: 4
PAINLEVEL_OUTOF10: 7
PAINLEVEL_OUTOF10: 4
PAINLEVEL_OUTOF10: 4

## 2019-09-13 VITALS
HEART RATE: 87 BPM | BODY MASS INDEX: 28.35 KG/M2 | DIASTOLIC BLOOD PRESSURE: 73 MMHG | OXYGEN SATURATION: 97 % | RESPIRATION RATE: 16 BRPM | SYSTOLIC BLOOD PRESSURE: 133 MMHG | WEIGHT: 160 LBS | HEIGHT: 63 IN | TEMPERATURE: 98.3 F

## 2019-09-13 PROCEDURE — 6370000000 HC RX 637 (ALT 250 FOR IP): Performed by: OBSTETRICS & GYNECOLOGY

## 2019-09-13 RX ORDER — OXYCODONE HYDROCHLORIDE AND ACETAMINOPHEN 5; 325 MG/1; MG/1
1 TABLET ORAL EVERY 4 HOURS PRN
Qty: 12 TABLET | Refills: 0 | Status: SHIPPED | OUTPATIENT
Start: 2019-09-13 | End: 2019-09-18

## 2019-09-13 RX ORDER — IBUPROFEN 800 MG/1
800 TABLET ORAL EVERY 8 HOURS PRN
Qty: 30 TABLET | Refills: 0 | Status: SHIPPED | OUTPATIENT
Start: 2019-09-13

## 2019-09-13 RX ADMIN — METFORMIN HYDROCHLORIDE 1 TABLET: 500 TABLET, EXTENDED RELEASE ORAL at 09:59

## 2019-09-13 RX ADMIN — OXYCODONE HYDROCHLORIDE AND ACETAMINOPHEN 1 TABLET: 5; 325 TABLET ORAL at 09:59

## 2019-09-13 RX ADMIN — FERROUS SULFATE TAB 325 MG (65 MG ELEMENTAL FE) 325 MG: 325 (65 FE) TAB at 09:59

## 2019-09-13 RX ADMIN — OXYCODONE HYDROCHLORIDE AND ACETAMINOPHEN 1 TABLET: 5; 325 TABLET ORAL at 19:17

## 2019-09-13 RX ADMIN — DOCUSATE SODIUM 100 MG: 100 CAPSULE, LIQUID FILLED ORAL at 09:59

## 2019-09-13 ASSESSMENT — PAIN SCALES - GENERAL
PAINLEVEL_OUTOF10: 4
PAINLEVEL_OUTOF10: 0
PAINLEVEL_OUTOF10: 4

## 2019-09-13 NOTE — ANESTHESIA PROCEDURE NOTES
Airway  Urgency: emergent      General Information and Staff    Patient location during procedure: OR  Anesthesiologist: Breanne Spicer MD  Resident/CRNA: Chrissie Lesch, APRN - CRNA  Other anesthesia staff: Romero Patel RN  Performed: other anesthesia staff     Final Airway Details  Final airway type: endotracheal airway          Additional Comments  See airway note in anesthesia record

## 2019-09-23 NOTE — DISCHARGE SUMMARY
Obstetric Discharge Summary    Admitting Diagnosis  IUP 38+ weeks  OB History      Para Term  AB Living    3 2 2     2    SAB TAB Ectopic Molar Multiple Live Births              2          Reasons for Admission on 2018 12:21 AM  Hyperemesis [R11.10]  Hyperemesis [R11.10]  Hyperemesis [R11.10]  No comment available  Onset of Labor    Prenatal Procedures  None    Intrapartum Procedures                  Delivery:        Postpartum Procedures  None    Postpartum/Operative Complications       [place] Data  This patient has no babies on file. Discharge With Mother  Complications: No    Discharge Diagnosis       Discharge Information  Discharge Medication List as of 2018  9:55 AM      CONTINUE these medications which have CHANGED    Details   ondansetron (ZOFRAN ODT) 4 MG disintegrating tablet Take 1 tablet by mouth every 8 hours as needed for Nausea or Vomiting, Disp-30 tablet, R-0Normal         CONTINUE these medications which have NOT CHANGED    Details   multivitamin (ANIMAL SHAPES) with C & FA CHEW chewable tablet Take 1 tablet by mouth dailyHistorical Med         STOP taking these medications       Feecry-XgFdr-FeSrm-Meth-FA-DHA (PRENATE MINI) 18-0.6-0.4-350 MG CAPS Comments:   Reason for Stopping:             Condition at Discharge:    Stable    No discharge procedures on file. Discharge to: Home  Follow up in 2 weeks at office.

## 2020-06-04 ENCOUNTER — HOSPITAL ENCOUNTER (EMERGENCY)
Dept: HOSPITAL 83 - ED | Age: 25
Discharge: HOME | End: 2020-06-04
Payer: COMMERCIAL

## 2020-06-04 VITALS — WEIGHT: 165 LBS | BODY MASS INDEX: 29.23 KG/M2 | HEIGHT: 62.99 IN

## 2020-06-04 DIAGNOSIS — K04.7: Primary | ICD-10-CM

## 2020-06-04 DIAGNOSIS — K02.9: ICD-10-CM

## 2020-06-04 DIAGNOSIS — Z91.018: ICD-10-CM

## 2020-06-04 DIAGNOSIS — Z91.030: ICD-10-CM

## 2021-03-03 ENCOUNTER — HOSPITAL ENCOUNTER (OUTPATIENT)
Dept: HOSPITAL 83 - COVID19 | Age: 26
Discharge: HOME | End: 2021-03-03
Attending: INTERNAL MEDICINE
Payer: COMMERCIAL

## 2021-03-03 ENCOUNTER — HOSPITAL ENCOUNTER (EMERGENCY)
Dept: HOSPITAL 83 - ED | Age: 26
Discharge: LEFT BEFORE DISCHARGE | End: 2021-03-03
Payer: COMMERCIAL

## 2021-03-03 VITALS — WEIGHT: 165 LBS | HEIGHT: 55 IN

## 2021-03-03 DIAGNOSIS — Z79.2: ICD-10-CM

## 2021-03-03 DIAGNOSIS — Z91.030: ICD-10-CM

## 2021-03-03 DIAGNOSIS — Z79.899: ICD-10-CM

## 2021-03-03 DIAGNOSIS — R10.9: Primary | ICD-10-CM

## 2021-03-03 DIAGNOSIS — F17.200: ICD-10-CM

## 2021-03-03 DIAGNOSIS — Z88.8: ICD-10-CM

## 2021-03-03 DIAGNOSIS — Z11.52: Primary | ICD-10-CM

## 2021-03-03 DIAGNOSIS — Z91.018: ICD-10-CM

## 2021-03-03 LAB
ALBUMIN SERPL-MCNC: 4.1 GM/DL (ref 3.1–4.5)
ALP SERPL-CCNC: 84 U/L (ref 45–117)
ALT SERPL W P-5'-P-CCNC: 20 U/L (ref 12–78)
APTT PPP: 28.2 SECONDS (ref 20–32.1)
AST SERPL-CCNC: 11 IU/L (ref 3–35)
B-HCG SERPL-ACNC: < 1 MIU/ML (ref 1–3)
BASOPHILS # BLD AUTO: 2 % (ref 0–1)
BUN SERPL-MCNC: 10 MG/DL (ref 7–24)
CHLORIDE SERPL-SCNC: 110 MMOL/L (ref 98–107)
CREAT SERPL-MCNC: 0.69 MG/DL (ref 0.55–1.02)
ERYTHROCYTE [DISTWIDTH] IN BLOOD BY AUTOMATED COUNT: 13 % (ref 0–14.5)
HCT VFR BLD AUTO: 46.5 % (ref 37–47)
INR BLD: 1 (ref 2–3.5)
LIPASE SERPL-CCNC: 42 U/L (ref 73–393)
MCH RBC QN AUTO: 28.6 PG (ref 27–31)
MCHC RBC AUTO-ENTMCNC: 32.5 G/DL (ref 33–37)
MCV RBC AUTO: 88.1 FL (ref 81–99)
NRBC BLD QL AUTO: 0 % (ref 0–0)
PLATELET # BLD AUTO: 128 10*3/UL (ref 130–400)
PLATELET SUFFICIENCY: (no result)
PMV BLD AUTO: 12 FL (ref 9.6–12.3)
POTASSIUM SERPL-SCNC: 3.9 MMOL/L (ref 3.5–5.1)
PROT SERPL-MCNC: 8.2 GM/DL (ref 6.4–8.2)
RBC # BLD AUTO: 5.28 10*6/UL (ref 4.1–5.1)
RBC MORPH BLD: NORMAL
SODIUM SERPL-SCNC: 140 MMOL/L (ref 136–145)
TOTAL CELLS COUNTED: 100 #CELLS
WBC NRBC COR # BLD AUTO: 18.3 10*3/UL (ref 4.8–10.8)

## 2021-03-14 ENCOUNTER — HOSPITAL ENCOUNTER (EMERGENCY)
Dept: HOSPITAL 83 - ED | Age: 26
Discharge: LEFT BEFORE DISCHARGE | End: 2021-03-14
Payer: COMMERCIAL

## 2021-03-14 DIAGNOSIS — K08.89: Primary | ICD-10-CM

## 2021-03-14 DIAGNOSIS — Z53.21: ICD-10-CM

## 2021-12-10 ENCOUNTER — HOSPITAL ENCOUNTER (OUTPATIENT)
Dept: HOSPITAL 83 - COVID19 | Age: 26
Discharge: HOME | End: 2021-12-10
Attending: STUDENT IN AN ORGANIZED HEALTH CARE EDUCATION/TRAINING PROGRAM
Payer: COMMERCIAL

## 2021-12-10 DIAGNOSIS — Z11.52: Primary | ICD-10-CM

## (undated) DEVICE — TUBING, SUCTION, 3/16" X 12', STRAIGHT: Brand: MEDLINE

## (undated) DEVICE — SPONGE LAP W18XL18IN WHT COT 4 PLY FLD STRUNG RADPQ DISP ST

## (undated) DEVICE — STAPLER SKIN SQ 30 ABSRB STPL DISP INSORB

## (undated) DEVICE — CONTAINER,SPEC,PNEUM TUBE,3OZ,STRL PATH: Brand: MEDLINE

## (undated) DEVICE — Z DISCONTINUED USE 2275676 GLOVE SURG SZ 65 L12IN FNGR THK87MIL DK GRN LTX FREE ISOLEX

## (undated) DEVICE — APPLICATOR PREP 26ML 0.7% IOD POVACRYLEX 74% ISO ALC ST

## (undated) DEVICE — GOWN,SIRUS,FABRNF,L,20/CS: Brand: MEDLINE

## (undated) DEVICE — SHEET,DRAPE,53X77,STERILE: Brand: MEDLINE

## (undated) DEVICE — 3M™ STERI-STRIP™ COMPOUND BENZOIN TINCTURE 40 BAGS/CARTON 4 CARTONS/CASE C1544: Brand: 3M™ STERI-STRIP™

## (undated) DEVICE — SUTURE ABSORBABLE MONOFILAMENT 0 TP1 60 IN VIO PDS + PDP991G

## (undated) DEVICE — ELECTRODE PT RET AD L9FT HI MOIST COND ADH HYDRGEL CORDED

## (undated) DEVICE — 3000CC GUARDIAN II: Brand: GUARDIAN

## (undated) DEVICE — SUTURE VCRL + SZ 0 L36IN ABSRB VLT L36MM CT-1 1/2 CIR VCP346H

## (undated) DEVICE — GLOVE SURG SZ 65 L12IN FNGR THK83MIL CRM POLYISOPRENE

## (undated) DEVICE — PEN: MARKING STD 100/CS: Brand: MEDICAL ACTION INDUSTRIES

## (undated) DEVICE — HYPODERMIC SAFETY NEEDLE: Brand: MAGELLAN

## (undated) DEVICE — BLADE SURG NO20 S STL STR DISP GLASSVAN

## (undated) DEVICE — CESAREAN BIRTH PACK II: Brand: MEDLINE INDUSTRIES, INC.

## (undated) DEVICE — CATHETERIZATION KIT FOL16 FR 2000 CC DRAINAGE BG LUBRICATH

## (undated) DEVICE — PENCIL ES L3M BTTN SWCH HOLSTER W/ BLDE ELECTRD EDGE

## (undated) DEVICE — TUBE BLD COLLECT ST 1 SIL COAT 7ML 10ML

## (undated) DEVICE — COUNTER NDL 30 COUNT DBL MAG

## (undated) DEVICE — TOWEL,OR,DSP,ST,BLUE,STD,6/PK,12PK/CS: Brand: MEDLINE

## (undated) DEVICE — TELFA ADHESIVE ISLAND DRESSING: Brand: TELFA

## (undated) DEVICE — STRIP,CLOSURE,WOUND,MEDI-STRIP,1/2X4: Brand: MEDLINE

## (undated) DEVICE — CONTAINER SPEC 64OZ POLYPR PATH SNAP LOK CAP W/ LID

## (undated) DEVICE — COVER,LIGHT HANDLE,FLX,2/PK: Brand: MEDLINE INDUSTRIES, INC.

## (undated) DEVICE — Device: Brand: PORTEX

## (undated) DEVICE — MEDI-VAC YANKAUER SUCTION HANDLE W/BULBOUS TIP: Brand: CARDINAL HEALTH